# Patient Record
Sex: FEMALE | Race: BLACK OR AFRICAN AMERICAN | Employment: OTHER | URBAN - METROPOLITAN AREA
[De-identification: names, ages, dates, MRNs, and addresses within clinical notes are randomized per-mention and may not be internally consistent; named-entity substitution may affect disease eponyms.]

---

## 2017-08-14 ENCOUNTER — HOSPITAL ENCOUNTER (OUTPATIENT)
Dept: MAMMOGRAPHY | Age: 72
Discharge: HOME | End: 2017-08-14
Attending: INTERNAL MEDICINE
Payer: MEDICARE

## 2017-08-14 DIAGNOSIS — Z12.31 VISIT FOR SCREENING MAMMOGRAM: ICD-10-CM

## 2017-08-14 PROCEDURE — 77067 SCR MAMMO BI INCL CAD: CPT

## 2017-08-14 PROCEDURE — 77063 BREAST TOMOSYNTHESIS BI: CPT

## 2018-10-24 ENCOUNTER — HOSPITAL ENCOUNTER (OUTPATIENT)
Dept: MAMMOGRAPHY | Age: 73
Discharge: HOME | End: 2018-10-24
Attending: INTERNAL MEDICINE
Payer: MEDICARE

## 2018-10-24 DIAGNOSIS — Z12.31 VISIT FOR SCREENING MAMMOGRAM: ICD-10-CM

## 2018-10-24 PROCEDURE — 77067 SCR MAMMO BI INCL CAD: CPT

## 2019-04-03 ENCOUNTER — APPOINTMENT (OUTPATIENT)
Dept: CT IMAGING | Age: 74
End: 2019-04-03
Attending: EMERGENCY MEDICINE
Payer: MEDICARE

## 2019-04-03 ENCOUNTER — HOSPITAL ENCOUNTER (EMERGENCY)
Age: 74
Discharge: HOME | End: 2019-04-03
Attending: EMERGENCY MEDICINE
Payer: MEDICARE

## 2019-04-03 VITALS
WEIGHT: 147 LBS | SYSTOLIC BLOOD PRESSURE: 120 MMHG | OXYGEN SATURATION: 100 % | DIASTOLIC BLOOD PRESSURE: 68 MMHG | TEMPERATURE: 99 F | HEART RATE: 86 BPM | RESPIRATION RATE: 16 BRPM | BODY MASS INDEX: 25.23 KG/M2

## 2019-04-03 DIAGNOSIS — R51.9 NONINTRACTABLE EPISODIC HEADACHE, UNSPECIFIED HEADACHE TYPE: Primary | ICD-10-CM

## 2019-04-03 DIAGNOSIS — M26.649 TMJ ARTHRITIS: ICD-10-CM

## 2019-04-03 LAB
ALBUMIN SERPL-MCNC: 4.1 G/DL (ref 3.4–5)
ALBUMIN/GLOB SERPL: 1 {RATIO} (ref 0.8–1.7)
ALP SERPL-CCNC: 71 U/L (ref 45–117)
ALT SERPL-CCNC: 17 U/L (ref 13–56)
ANION GAP SERPL CALC-SCNC: 6 MMOL/L (ref 3–18)
AST SERPL-CCNC: 36 U/L (ref 15–37)
BASOPHILS # BLD: 0 K/UL (ref 0–0.1)
BASOPHILS NFR BLD: 1 % (ref 0–2)
BILIRUB SERPL-MCNC: 0.7 MG/DL (ref 0.2–1)
BUN SERPL-MCNC: 18 MG/DL (ref 7–18)
BUN/CREAT SERPL: 13 (ref 12–20)
CALCIUM SERPL-MCNC: 9.2 MG/DL (ref 8.5–10.1)
CHLORIDE SERPL-SCNC: 99 MMOL/L (ref 100–108)
CO2 SERPL-SCNC: 29 MMOL/L (ref 21–32)
CREAT SERPL-MCNC: 1.35 MG/DL (ref 0.6–1.3)
DIFFERENTIAL METHOD BLD: ABNORMAL
EOSINOPHIL # BLD: 0.1 K/UL (ref 0–0.4)
EOSINOPHIL NFR BLD: 1 % (ref 0–5)
ERYTHROCYTE [DISTWIDTH] IN BLOOD BY AUTOMATED COUNT: 13.4 % (ref 11.6–14.5)
ERYTHROCYTE [SEDIMENTATION RATE] IN BLOOD: 38 MM/HR (ref 0–30)
GLOBULIN SER CALC-MCNC: 4.1 G/DL (ref 2–4)
GLUCOSE SERPL-MCNC: 105 MG/DL (ref 74–99)
HCT VFR BLD AUTO: 31.4 % (ref 35–45)
HGB BLD-MCNC: 10.1 G/DL (ref 12–16)
LYMPHOCYTES # BLD: 2.5 K/UL (ref 0.9–3.6)
LYMPHOCYTES NFR BLD: 33 % (ref 21–52)
MCH RBC QN AUTO: 25.9 PG (ref 24–34)
MCHC RBC AUTO-ENTMCNC: 32.2 G/DL (ref 31–37)
MCV RBC AUTO: 80.5 FL (ref 74–97)
MONOCYTES # BLD: 0.5 K/UL (ref 0.05–1.2)
MONOCYTES NFR BLD: 7 % (ref 3–10)
NEUTS SEG # BLD: 4.6 K/UL (ref 1.8–8)
NEUTS SEG NFR BLD: 58 % (ref 40–73)
PLATELET # BLD AUTO: 264 K/UL (ref 135–420)
PMV BLD AUTO: 9.6 FL (ref 9.2–11.8)
POTASSIUM SERPL-SCNC: 3.8 MMOL/L (ref 3.5–5.5)
PROT SERPL-MCNC: 8.2 G/DL (ref 6.4–8.2)
RBC # BLD AUTO: 3.9 M/UL (ref 4.2–5.3)
SODIUM SERPL-SCNC: 134 MMOL/L (ref 136–145)
WBC # BLD AUTO: 7.7 K/UL (ref 4.6–13.2)

## 2019-04-03 PROCEDURE — 74011250637 HC RX REV CODE- 250/637: Performed by: EMERGENCY MEDICINE

## 2019-04-03 PROCEDURE — 99283 EMERGENCY DEPT VISIT LOW MDM: CPT

## 2019-04-03 PROCEDURE — 80053 COMPREHEN METABOLIC PANEL: CPT

## 2019-04-03 PROCEDURE — 74011636637 HC RX REV CODE- 636/637: Performed by: EMERGENCY MEDICINE

## 2019-04-03 PROCEDURE — 70450 CT HEAD/BRAIN W/O DYE: CPT

## 2019-04-03 PROCEDURE — 85025 COMPLETE CBC W/AUTO DIFF WBC: CPT

## 2019-04-03 PROCEDURE — 85652 RBC SED RATE AUTOMATED: CPT

## 2019-04-03 RX ORDER — PREDNISONE 20 MG/1
60 TABLET ORAL
Status: COMPLETED | OUTPATIENT
Start: 2019-04-03 | End: 2019-04-03

## 2019-04-03 RX ORDER — ACETAMINOPHEN 500 MG
1000 TABLET ORAL
Status: COMPLETED | OUTPATIENT
Start: 2019-04-03 | End: 2019-04-03

## 2019-04-03 RX ORDER — METHYLPREDNISOLONE 4 MG/1
TABLET ORAL
Qty: 1 DOSE PACK | Refills: 0 | Status: SHIPPED | OUTPATIENT
Start: 2019-04-03 | End: 2022-08-01

## 2019-04-03 RX ADMIN — ACETAMINOPHEN 1000 MG: 500 TABLET ORAL at 11:31

## 2019-04-03 RX ADMIN — PREDNISONE 60 MG: 20 TABLET ORAL at 11:31

## 2019-04-03 NOTE — ED NOTES
Pt arrives to exam room f3 with c/o HA that \"its gotten better already, but my parents  of heart attacks and strokes so I want to make sure I'm okay since I get headaches like this every month. \" pupils 4mm, muscle  equal with strong , pt ambulatory with steady gait, a/ox4, speaking in complete sentences with clear speech.

## 2019-04-03 NOTE — DISCHARGE INSTRUCTIONS
Headache: Care Instructions  Your Care Instructions    Headaches have many possible causes. Most headaches aren't a sign of a more serious problem, and they will get better on their own. Home treatment may help you feel better faster. The doctor has checked you carefully, but problems can develop later. If you notice any problems or new symptoms, get medical treatment right away. Follow-up care is a key part of your treatment and safety. Be sure to make and go to all appointments, and call your doctor if you are having problems. It's also a good idea to know your test results and keep a list of the medicines you take. How can you care for yourself at home? · Do not drive if you have taken a prescription pain medicine. · Rest in a quiet, dark room until your headache is gone. Close your eyes and try to relax or go to sleep. Don't watch TV or read. · Put a cold, moist cloth or cold pack on the painful area for 10 to 20 minutes at a time. Put a thin cloth between the cold pack and your skin. · Use a warm, moist towel or a heating pad set on low to relax tight shoulder and neck muscles. · Have someone gently massage your neck and shoulders. · Take pain medicines exactly as directed. ? If the doctor gave you a prescription medicine for pain, take it as prescribed. ? If you are not taking a prescription pain medicine, ask your doctor if you can take an over-the-counter medicine. · Be careful not to take pain medicine more often than the instructions allow, because you may get worse or more frequent headaches when the medicine wears off. · Do not ignore new symptoms that occur with a headache, such as a fever, weakness or numbness, vision changes, or confusion. These may be signs of a more serious problem. To prevent headaches  · Keep a headache diary so you can figure out what triggers your headaches. Avoiding triggers may help you prevent headaches.  Record when each headache began, how long it lasted, and what the pain was like (throbbing, aching, stabbing, or dull). Write down any other symptoms you had with the headache, such as nausea, flashing lights or dark spots, or sensitivity to bright light or loud noise. Note if the headache occurred near your period. List anything that might have triggered the headache, such as certain foods (chocolate, cheese, wine) or odors, smoke, bright light, stress, or lack of sleep. · Find healthy ways to deal with stress. Headaches are most common during or right after stressful times. Take time to relax before and after you do something that has caused a headache in the past.  · Try to keep your muscles relaxed by keeping good posture. Check your jaw, face, neck, and shoulder muscles for tension, and try relaxing them. When sitting at a desk, change positions often, and stretch for 30 seconds each hour. · Get plenty of sleep and exercise. · Eat regularly and well. Long periods without food can trigger a headache. · Treat yourself to a massage. Some people find that regular massages are very helpful in relieving tension. · Limit caffeine by not drinking too much coffee, tea, or soda. But don't quit caffeine suddenly, because that can also give you headaches. · Reduce eyestrain from computers by blinking frequently and looking away from the computer screen every so often. Make sure you have proper eyewear and that your monitor is set up properly, about an arm's length away. · Seek help if you have depression or anxiety. Your headaches may be linked to these conditions. Treatment can both prevent headaches and help with symptoms of anxiety or depression. When should you call for help? Call 911 anytime you think you may need emergency care. For example, call if:    · You have signs of a stroke. These may include:  ? Sudden numbness, paralysis, or weakness in your face, arm, or leg, especially on only one side of your body. ? Sudden vision changes.   ? Sudden trouble speaking. ? Sudden confusion or trouble understanding simple statements. ? Sudden problems with walking or balance. ? A sudden, severe headache that is different from past headaches.    Call your doctor now or seek immediate medical care if:    · You have a new or worse headache.     · Your headache gets much worse. Where can you learn more? Go to http://shiva-saumya.info/. Enter M271 in the search box to learn more about \"Headache: Care Instructions. \"  Current as of: Rosetta 3, 2018  Content Version: 11.9  © 7984-6798 [employer]. Care instructions adapted under license by [employer] (which disclaims liability or warranty for this information). If you have questions about a medical condition or this instruction, always ask your healthcare professional. [address] any warranty or liability for your use of this information. Patient Education        Headache: Care Instructions  Your Care Instructions    Headaches have many possible causes. Most headaches aren't a sign of a more serious problem, and they will get better on their own. Home treatment may help you feel better faster. The doctor has checked you carefully, but problems can develop later. If you notice any problems or new symptoms, get medical treatment right away. Follow-up care is a key part of your treatment and safety. Be sure to make and go to all appointments, and call your doctor if you are having problems. It's also a good idea to know your test results and keep a list of the medicines you take. How can you care for yourself at home? · Do not drive if you have taken a prescription pain medicine. · Rest in a quiet, dark room until your headache is gone. Close your eyes and try to relax or go to sleep. Don't watch TV or read. · Put a cold, moist cloth or cold pack on the painful area for 10 to 20 minutes at a time.  Put a thin cloth between the cold pack and your skin.  · Use a warm, moist towel or a heating pad set on low to relax tight shoulder and neck muscles. · Have someone gently massage your neck and shoulders. · Take pain medicines exactly as directed. ? If the doctor gave you a prescription medicine for pain, take it as prescribed. ? If you are not taking a prescription pain medicine, ask your doctor if you can take an over-the-counter medicine. · Be careful not to take pain medicine more often than the instructions allow, because you may get worse or more frequent headaches when the medicine wears off. · Do not ignore new symptoms that occur with a headache, such as a fever, weakness or numbness, vision changes, or confusion. These may be signs of a more serious problem. To prevent headaches  · Keep a headache diary so you can figure out what triggers your headaches. Avoiding triggers may help you prevent headaches. Record when each headache began, how long it lasted, and what the pain was like (throbbing, aching, stabbing, or dull). Write down any other symptoms you had with the headache, such as nausea, flashing lights or dark spots, or sensitivity to bright light or loud noise. Note if the headache occurred near your period. List anything that might have triggered the headache, such as certain foods (chocolate, cheese, wine) or odors, smoke, bright light, stress, or lack of sleep. · Find healthy ways to deal with stress. Headaches are most common during or right after stressful times. Take time to relax before and after you do something that has caused a headache in the past.  · Try to keep your muscles relaxed by keeping good posture. Check your jaw, face, neck, and shoulder muscles for tension, and try relaxing them. When sitting at a desk, change positions often, and stretch for 30 seconds each hour. · Get plenty of sleep and exercise. · Eat regularly and well. Long periods without food can trigger a headache. · Treat yourself to a massage.  Some people find that regular massages are very helpful in relieving tension. · Limit caffeine by not drinking too much coffee, tea, or soda. But don't quit caffeine suddenly, because that can also give you headaches. · Reduce eyestrain from computers by blinking frequently and looking away from the computer screen every so often. Make sure you have proper eyewear and that your monitor is set up properly, about an arm's length away. · Seek help if you have depression or anxiety. Your headaches may be linked to these conditions. Treatment can both prevent headaches and help with symptoms of anxiety or depression. When should you call for help? Call 911 anytime you think you may need emergency care. For example, call if:    · You have signs of a stroke. These may include:  ? Sudden numbness, paralysis, or weakness in your face, arm, or leg, especially on only one side of your body. ? Sudden vision changes. ? Sudden trouble speaking. ? Sudden confusion or trouble understanding simple statements. ? Sudden problems with walking or balance. ? A sudden, severe headache that is different from past headaches.    Call your doctor now or seek immediate medical care if:    · You have a new or worse headache.     · Your headache gets much worse. Where can you learn more? Go to http://shiva-saumya.info/. Enter M271 in the search box to learn more about \"Headache: Care Instructions. \"  Current as of: Rosetta 3, 2018  Content Version: 11.9  © 3747-2776 [employer]. Care instructions adapted under license by [employer] (which disclaims liability or warranty for this information). If you have questions about a medical condition or this instruction, always ask your healthcare professional. [place] [address] any warranty or liability for your use of this information.

## 2019-04-03 NOTE — ED PROVIDER NOTES
EMERGENCY DEPARTMENT HISTORY AND PHYSICAL EXAM    Date: 4/3/2019  Patient Name: Kelvin Crow    History of Presenting Illness     Chief Complaint   Patient presents with    Headache         History Provided By: Patient      Additional History (Context): Kelvin Crow is a 66 y.o. female with hypertension and hyperlipidemia who presents with headache since last night. Pain is in the right temple radiating over to the forehead with an occasional Milena charting pain to the vertex of her head. Denies recent head trauma, nausea vomiting, nuchal rigidity or fever. Denies unilateral weakness, changes in speech or vision. Patient had a similar headache approximately 1 month ago. Takes her blood pressure medications regularly. Blood pressure here is 128/1 20/68. Took Excedrin last time this happened and things resolved easily. Took Excedrin last night and thinks improved but this morning when she woke she was still having a little bit of a headache so she came on in having driven herself. PCP: Jamaal Escamilla MD    Current Facility-Administered Medications   Medication Dose Route Frequency Provider Last Rate Last Dose    predniSONE (DELTASONE) tablet 60 mg  60 mg Oral NOW Sima Spence PA        acetaminophen (TYLENOL) tablet 1,000 mg  1,000 mg Oral NOW Sima Spence PA         Current Outpatient Medications   Medication Sig Dispense Refill    cloNIDine HCl (CATAPRES) 0.2 mg tablet Take 0.2 mg by mouth two (2) times a day. Indications: HYPERTENSION      minoxidil (LONITEN) 10 mg tablet Take 10 mg by mouth daily. Indications: HYPERTENSION, takes 1/2 pill daily      hydrochlorothiazide (HYDRODIURIL) 25 mg tablet Take 25 mg by mouth daily. Indications: HYPERTENSION      atenolol (TENORMIN) 50 mg tablet Take 50 mg by mouth daily. Indications: HYPERTENSION      simvastatin (ZOCOR) 40 mg tablet Take 40 mg by mouth nightly.  captopril (CAPOTEN) 50 mg tablet Take 50 mg by mouth two (2) times a day. Indications: HYPERTENSION         Past History     Past Medical History:  Past Medical History:   Diagnosis Date    Arthritis     High cholesterol     Hypertension     Ill-defined condition     Enlarged Heart        Past Surgical History:  Past Surgical History:   Procedure Laterality Date    BREAST SURGERY PROCEDURE UNLISTED      cyst removed    CARDIAC SURG PROCEDURE UNLIST      cardiac cath       Family History:  Family History   Problem Relation Age of Onset    Cancer Brother        Social History:  Social History     Tobacco Use    Smoking status: Never Smoker   Substance Use Topics    Alcohol use: No    Drug use: No       Allergies:  No Known Allergies      Review of Systems   Review of Systems   Constitutional: Negative for fever. Eyes: Negative for visual disturbance. Gastrointestinal: Negative for nausea and vomiting. Musculoskeletal: Negative for neck pain and neck stiffness. Skin: Negative for rash and wound. Neurological: Positive for headaches. Negative for dizziness, tremors, seizures, syncope, facial asymmetry, speech difficulty, weakness, light-headedness and numbness. All Other Systems Negative  Physical Exam     Vitals:    04/03/19 0949   BP: 120/68   Pulse: 86   Resp: 16   Temp: 99 °F (37.2 °C)   SpO2: 100%   Weight: 66.7 kg (147 lb)     Physical Exam   Constitutional: She is oriented to person, place, and time. She appears well-developed. HENT:   Head: Normocephalic and atraumatic. Eyes: Pupils are equal, round, and reactive to light. EOM are normal.   No nystagmus. Neck: No JVD present. No tracheal deviation present. No thyromegaly present. Cardiovascular: Normal rate, regular rhythm and normal heart sounds. Exam reveals no gallop and no friction rub. No murmur heard. Pulmonary/Chest: Effort normal and breath sounds normal. No stridor. No respiratory distress. She has no wheezes. She has no rales. She exhibits no tenderness. Abdominal: Soft.  She exhibits no distension and no mass. There is no tenderness. There is no rebound and no guarding. Musculoskeletal: She exhibits no edema or tenderness. Lymphadenopathy:     She has no cervical adenopathy. Neurological: She is alert and oriented to person, place, and time. Negative Romberg. No dysdiadochokinesis, past tremor, past pointing. Normal heel shin. Skin: Skin is warm and dry. No rash noted. No erythema. No pallor. Psychiatric: She has a normal mood and affect. Her behavior is normal. Thought content normal.   Nursing note and vitals reviewed. Diagnostic Study Results     Labs -     Recent Results (from the past 12 hour(s))   CBC WITH AUTOMATED DIFF    Collection Time: 04/03/19 10:24 AM   Result Value Ref Range    WBC 7.7 4.6 - 13.2 K/uL    RBC 3.90 (L) 4.20 - 5.30 M/uL    HGB 10.1 (L) 12.0 - 16.0 g/dL    HCT 31.4 (L) 35.0 - 45.0 %    MCV 80.5 74.0 - 97.0 FL    MCH 25.9 24.0 - 34.0 PG    MCHC 32.2 31.0 - 37.0 g/dL    RDW 13.4 11.6 - 14.5 %    PLATELET 840 800 - 871 K/uL    MPV 9.6 9.2 - 11.8 FL    NEUTROPHILS 58 40 - 73 %    LYMPHOCYTES 33 21 - 52 %    MONOCYTES 7 3 - 10 %    EOSINOPHILS 1 0 - 5 %    BASOPHILS 1 0 - 2 %    ABS. NEUTROPHILS 4.6 1.8 - 8.0 K/UL    ABS. LYMPHOCYTES 2.5 0.9 - 3.6 K/UL    ABS. MONOCYTES 0.5 0.05 - 1.2 K/UL    ABS. EOSINOPHILS 0.1 0.0 - 0.4 K/UL    ABS.  BASOPHILS 0.0 0.0 - 0.1 K/UL    DF AUTOMATED     METABOLIC PANEL, COMPREHENSIVE    Collection Time: 04/03/19 10:24 AM   Result Value Ref Range    Sodium 134 (L) 136 - 145 mmol/L    Potassium 3.8 3.5 - 5.5 mmol/L    Chloride 99 (L) 100 - 108 mmol/L    CO2 29 21 - 32 mmol/L    Anion gap 6 3.0 - 18 mmol/L    Glucose 105 (H) 74 - 99 mg/dL    BUN 18 7.0 - 18 MG/DL    Creatinine 1.35 (H) 0.6 - 1.3 MG/DL    BUN/Creatinine ratio 13 12 - 20      GFR est AA 46 (L) >60 ml/min/1.73m2    GFR est non-AA 38 (L) >60 ml/min/1.73m2    Calcium 9.2 8.5 - 10.1 MG/DL    Bilirubin, total 0.7 0.2 - 1.0 MG/DL    ALT (SGPT) 17 13 - 56 U/L    AST (SGOT) 36 15 - 37 U/L    Alk. phosphatase 71 45 - 117 U/L    Protein, total 8.2 6.4 - 8.2 g/dL    Albumin 4.1 3.4 - 5.0 g/dL    Globulin 4.1 (H) 2.0 - 4.0 g/dL    A-G Ratio 1.0 0.8 - 1.7     SED RATE (ESR)    Collection Time: 04/03/19 10:24 AM   Result Value Ref Range    Sed rate, automated 38 (H) 0 - 30 mm/hr       Radiologic Studies -   CT HEAD WO CONT   Final Result   IMPRESSION:    1. No acute intracranial findings. 2. Chronic insults with mild encephalomalacia in the superior right greater than   left cerebellum. 3. Suspected at least mild spinal stenosis at C1 due to calcific ligamentous   tissues posterior to odontoid. No spinal canal could be best evaluated with   cervical spine MR imaging. 4. 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 CT Results  (Last 48 hours)               04/03/19 1102  CT HEAD WO CONT Final result    Impression:  IMPRESSION:    1. No acute intracranial findings. 2. Chronic insults with mild encephalomalacia in the superior right greater than   left cerebellum. 3. Suspected at least mild spinal stenosis at C1 due to calcific ligamentous   tissues posterior to odontoid. No spinal canal could be best evaluated with   cervical spine MR imaging. 4. 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 Narrative:  CT OF THE HEAD WITHOUT CONTRAST. CLINICAL HISTORY: Severe headache, monthly. TECHNIQUE: Helical scan obtained of the head were obtained from the skull vertex   through the base of the skull without intravenous contrast.    All CT scans are   performed using dose optimization techniques as appropriate to the performed   exam including the following: Automated exposure control, adjustment of mA   and/or kV according to patient size, and use of iterative reconstructive   technique. COMPARISON: None.        FINDINGS:        Peripheral site of encephalomalacia at the posterior superior right cerebellum. There is small site of cortical atrophy versus additional encephalomalacia at   the superior left cerebellum. Minimal periventricular white matter hypodensities   for age. Remainder of the sulcal pattern is normal. No hydrocephalus. No   intracranial hemorrhage. No mass effect. The visualized paranasal sinuses are   clear. The mastoid air cells are clear. A least mild temporomandibular   degenerative joint disease with osteophyte formation bilaterally. Heavy, thick   calcific ligamentous tissues posterior to the odontoid may contribute to at   least mild spinal stenosis at C1 level, only partially imaged. CXR Results  (Last 48 hours)    None            Medical Decision Making   I am the first provider for this patient. I reviewed the vital signs, available nursing notes, past medical history, past surgical history, family history and social history. Vital Signs-Reviewed the patient's vital signs. Records Reviewed: Nursing Notes    Procedures:  Procedures    Provider Notes (Medical Decision Making): Check labs and sent for scan. CT scan reassuring. Patient has very slight very slight elevation of her sed rate. Patient says that her headache was actually more intense last night and was actually resolving this morning but since it was still there at which which is why she came to the emergency department. Will get vascular surgeon for nonresolution of symptoms as well as a steroid here. Patient says her headache has completely resolved. DC home. MED RECONCILIATION:  Current Facility-Administered Medications   Medication Dose Route Frequency    predniSONE (DELTASONE) tablet 60 mg  60 mg Oral NOW    acetaminophen (TYLENOL) tablet 1,000 mg  1,000 mg Oral NOW     Current Outpatient Medications   Medication Sig    cloNIDine HCl (CATAPRES) 0.2 mg tablet Take 0.2 mg by mouth two (2) times a day.  Indications: HYPERTENSION    minoxidil (LONITEN) 10 mg tablet Take 10 mg by mouth daily. Indications: HYPERTENSION, takes 1/2 pill daily    hydrochlorothiazide (HYDRODIURIL) 25 mg tablet Take 25 mg by mouth daily. Indications: HYPERTENSION    atenolol (TENORMIN) 50 mg tablet Take 50 mg by mouth daily. Indications: HYPERTENSION    simvastatin (ZOCOR) 40 mg tablet Take 40 mg by mouth nightly.  captopril (CAPOTEN) 50 mg tablet Take 50 mg by mouth two (2) times a day. Indications: HYPERTENSION       Disposition:  home    DISCHARGE NOTE:   11:32 AM    Pt has been reexamined. Patient has no new complaints, changes, or physical findings. Care plan outlined and precautions discussed. Results of labs, CT were reviewed with the patient. All medications were reviewed with the patient; will d/c home with prednisone. All of pt's questions and concerns were addressed. Patient was instructed and agrees to follow up with PCP, vascular, as well as to return to the ED upon further deterioration. Patient is ready to go home. Follow-up Information     Follow up With Specialties Details Why Contact Info    Coreen Talavera MD Internal Medicine Schedule an appointment as soon as possible for a visit today  [address] [facility]  [phone]      Sukhdev Paulino MD Vascular Surgery Schedule an appointment as soon as possible for a visit in 1 day if your headache doesn't resolve [facility] [address]  [phone]      [facility] EMERGENCY DEPT Emergency Medicine  If symptoms worsen return immediately [address]  [phone]          Current Discharge Medication List            Diagnosis     Clinical Impression:   1. Nonintractable episodic headache, unspecified headache type    2.  TMJ arthritis

## 2020-12-10 ENCOUNTER — APPOINTMENT (OUTPATIENT)
Dept: CT IMAGING | Age: 75
End: 2020-12-10
Attending: EMERGENCY MEDICINE
Payer: MEDICARE

## 2020-12-10 ENCOUNTER — HOSPITAL ENCOUNTER (EMERGENCY)
Age: 75
Discharge: HOME | End: 2020-12-10
Attending: EMERGENCY MEDICINE
Payer: MEDICARE

## 2020-12-10 VITALS
OXYGEN SATURATION: 98 % | HEART RATE: 91 BPM | TEMPERATURE: 98.9 F | RESPIRATION RATE: 19 BRPM | SYSTOLIC BLOOD PRESSURE: 125 MMHG | DIASTOLIC BLOOD PRESSURE: 76 MMHG

## 2020-12-10 DIAGNOSIS — G44.219 EPISODIC TENSION-TYPE HEADACHE, NOT INTRACTABLE: Primary | ICD-10-CM

## 2020-12-10 PROCEDURE — 70450 CT HEAD/BRAIN W/O DYE: CPT

## 2020-12-10 PROCEDURE — 74011250637 HC RX REV CODE- 250/637: Performed by: EMERGENCY MEDICINE

## 2020-12-10 PROCEDURE — 99282 EMERGENCY DEPT VISIT SF MDM: CPT

## 2020-12-10 RX ORDER — ACETAMINOPHEN 325 MG/1
650 TABLET ORAL ONCE
Status: COMPLETED | OUTPATIENT
Start: 2020-12-10 | End: 2020-12-10

## 2020-12-10 RX ADMIN — ACETAMINOPHEN 650 MG: 325 TABLET ORAL at 12:35

## 2020-12-10 NOTE — ED PROVIDER NOTES
EMERGENCY DEPARTMENT HISTORY AND PHYSICAL EXAM    8:52 AM      Date: 12/10/2020  Patient Name: Rahul Acharya    History of Presenting Illness     Chief Complaint   Patient presents with    Headache         History Provided By: Patient    Additional History (Context): Rahul Acharya is a 66 y.o. female with hypertension and hyperlipidemia who presents with chief complaint of a headache intermittent for the past 3 days. She states that she was taking Excedrin but her PCP had her stop taking that to take regular Tylenol. She states that she did not have any this time to take. She reports that Excedrin and Tylenol usually helps the pain of her head ache. She states that the headache is mainly on the right side but also goes towards the back of her head. She denies any sinus pressure, congestion, dizziness, blurry vision, weakness, numbness, fever, trauma, and no other complaints. PCP: Janet Rae MD        Past History     Past Medical History:  Past Medical History:   Diagnosis Date    Arthritis     High cholesterol     Hypertension     Ill-defined condition     Enlarged Heart        Past Surgical History:  Past Surgical History:   Procedure Laterality Date    BREAST SURGERY PROCEDURE UNLISTED      cyst removed    CARDIAC SURG PROCEDURE UNLIST      cardiac cath       Family History:  Family History   Problem Relation Age of Onset    Cancer Brother        Social History:  Social History     Tobacco Use    Smoking status: Never Smoker   Substance Use Topics    Alcohol use: No    Drug use: No       Allergies:  No Known Allergies      Review of Systems       Review of Systems   Constitutional: Negative for chills and fever. HENT: Negative for congestion, rhinorrhea, sore throat and trouble swallowing. Eyes: Negative for photophobia and visual disturbance. Respiratory: Negative for cough and shortness of breath. Cardiovascular: Negative for chest pain.    Gastrointestinal: Negative for nausea and vomiting. Endocrine: Negative for polyuria. Genitourinary: Negative. Musculoskeletal: Negative for arthralgias, neck pain and neck stiffness. Skin: Negative for pallor and rash. Neurological: Positive for headaches. Negative for dizziness, weakness and numbness. Hematological: Does not bruise/bleed easily. Psychiatric/Behavioral: Negative for confusion and dysphoric mood. All other systems reviewed and are negative. Physical Exam     Visit Vitals  /76 (BP 1 Location: Right arm, BP Patient Position: At rest)   Pulse 91   Temp 98.9 °F (37.2 °C)   Resp 19   SpO2 98%         Physical Exam  Vitals signs and nursing note reviewed. Constitutional:       General: She is not in acute distress. Appearance: She is well-developed. She is not ill-appearing, toxic-appearing or diaphoretic. HENT:      Head: Normocephalic and atraumatic. Comments: No sinus pressure to palpation    No temporal tenderness to palpation     Nose: No congestion. Eyes:      General: No scleral icterus. Extraocular Movements: Extraocular movements intact. Conjunctiva/sclera: Conjunctivae normal.      Pupils: Pupils are equal, round, and reactive to light. Neck:      Musculoskeletal: Normal range of motion and neck supple. Vascular: No carotid bruit. Cardiovascular:      Rate and Rhythm: Normal rate. Pulses: Normal pulses. Heart sounds: Normal heart sounds. Pulmonary:      Effort: Pulmonary effort is normal. No respiratory distress. Breath sounds: Normal breath sounds. No wheezing. Musculoskeletal: Normal range of motion. Lymphadenopathy:      Cervical: No cervical adenopathy. Skin:     General: Skin is warm and dry. Neurological:      General: No focal deficit present. Mental Status: She is alert and oriented to person, place, and time. Cranial Nerves: No cranial nerve deficit. Sensory: No sensory deficit. Motor: No weakness. Coordination: Coordination normal.      Comments: No cerebellar ataxia on finger-nose test  No slurred speech  No facial droop  Strength 5 out of 5 bilateral upper and lower extremities   Psychiatric:         Mood and Affect: Mood normal.           Diagnostic Study Results     Labs -  No results found for this or any previous visit (from the past 12 hour(s)). Radiologic Studies -   CT HEAD WO CONT   Final Result   IMPRESSION:       1. No acute findings or change to prior. 2. 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 3. Cervical spinal stenosis at due to hypertrophied calcific ligamentous tissues   posterior to odontoid. Spinal canal and cord assessment could be best evaluated   with MRI cervical spine imaging. Medical Decision Making   I am the first provider for this patient. I reviewed the vital signs, available nursing notes, past medical history, past surgical history, family history and social history. Vital Signs-Reviewed the patient's vital signs. Records Reviewed: Nursing Notes and Old Medical Records (Time of Review: 8:52 AM)    Provider Notes (Medical Decision Making): DDx: Tension headache, brain mass, ICH    Doubt SAH    Get CT brain, give Tylenol    Neuro grossly intact    MDM    Medications   acetaminophen (TYLENOL) tablet 650 mg (650 mg Oral Given 12/10/20 1235)         ED Course: Progress Notes, Reevaluation, and Consults: We will have her take over-the-counter Tylenol    I have reassessed the patient. I have discussed the workup, results and plan with the patient and patient is in agreement. Patient has no new complaints. Patient was discharge in stable condition. Patient was given outpatient follow up. Patient is to return to emergency department if any new or worsening condition. Diagnosis     Clinical Impression:   1.  Episodic tension-type headache, not intractable        Disposition: Discharged    Follow-up Information     Follow up With Specialties Details Why Contact Info    Twyla Cristobal MD Internal Medicine Schedule an appointment as soon as possible for a visit in 3 days  [address] [facility] EMERGENCY DEPT Emergency Medicine  If symptoms worsen, As needed [address]  [phone]           Patient's Medications   Start Taking    No medications on file   Continue Taking    ATENOLOL (TENORMIN) 50 MG TABLET    Take 50 mg by mouth daily. Indications: HYPERTENSION    CAPTOPRIL (CAPOTEN) 50 MG TABLET    Take 50 mg by mouth two (2) times a day. Indications: HYPERTENSION    CLONIDINE HCL (CATAPRES) 0.2 MG TABLET    Take 0.2 mg by mouth two (2) times a day. Indications: HYPERTENSION    HYDROCHLOROTHIAZIDE (HYDRODIURIL) 25 MG TABLET    Take 25 mg by mouth daily. Indications: HYPERTENSION    METHYLPREDNISOLONE (MEDROL, DAVINA,) 4 MG TABLET    Per dose pack instructions    MINOXIDIL (LONITEN) 10 MG TABLET    Take 10 mg by mouth daily. Indications: HYPERTENSION, takes 1/2 pill daily    SIMVASTATIN (ZOCOR) 40 MG TABLET    Take 40 mg by mouth nightly. These Medications have changed    No medications on file   Stop Taking    No medications on file         Antoinette Guevara DO    Dragon medical dictation software was used for portions of this report. Unintended transcription errors may occur. My signature above authenticates this document and my orders, the final    diagnosis (es), discharge prescription (s), and instructions in the Epic    record.

## 2020-12-10 NOTE — ED TRIAGE NOTES
Patient A/O x 4, complaining of headache x 3 days. Patient denies N/V, blurred vision, gait changes or other complaints.

## 2020-12-10 NOTE — DISCHARGE INSTRUCTIONS
Patient Education     Take over-the-counter Tylenol as recommended by your primary care provider     Tension Headache: Care Instructions  Your Care Instructions  Most headaches are tension headaches. These headaches tend to happen again, especially if you are under stress. A tension headache may cause pain or a feeling of pressure all over your head. You probably can't pinpoint the center of the pain. If you keep getting tension headaches, the best thing you can do to limit them is to find out what is causing them and then make changes in those areas. Follow-up care is a key part of your treatment and safety. Be sure to make and go to all appointments, and call your doctor if you are having problems. It's also a good idea to know your test results and keep a list of the medicines you take. How can you care for yourself at home? · Rest in a quiet, dark room with a cool cloth on your forehead until your headache is gone. Close your eyes, and try to relax or go to sleep. Don't watch TV or read. Avoid using the computer. · Use a warm, moist towel or a heating pad set on low to relax tight shoulder and neck muscles. · Have someone gently massage your neck and shoulders. · Take pain medicines exactly as directed. ? If the doctor gave you a prescription medicine for pain, take it as prescribed. ? If you are not taking a prescription pain medicine, ask your doctor if you can take an over-the-counter medicine. · Be careful not to take pain medicine more often than the instructions allow, because you may get worse or more frequent headaches when the medicine wears off. · If you get another tension headache, stop what you are doing and sit quietly for a moment. Close your eyes and breathe slowly. Try to relax your head and neck muscles. · Do not ignore new symptoms that occur with a headache, such as fever, weakness or numbness, vision changes, or confusion. These may be signs of a more serious problem.   To help prevent headaches  · Keep a headache diary so you can figure out what triggers your headaches. Avoiding triggers may help you prevent headaches. Record when each headache began, how long it lasted, and what the pain was like (throbbing, aching, stabbing, or dull). List anything that may have triggered the headache, such as being physically or emotionally stressed or being anxious or depressed. Other possible triggers are hunger, anger, fatigue, poor posture, and muscle strain. · Find healthy ways to deal with stress. Headaches are most common during or right after stressful times. Take time to relax before and after you do something that has caused a headache in the past.  · Exercise daily to relieve stress. Relaxation exercises may help reduce tension. · Get plenty of sleep. · Eat regularly and well. Long periods without food can trigger a headache. · Treat yourself to a massage. Some people find that massages are very helpful in relieving tension. · Try to keep your muscles relaxed by keeping good posture. Check your jaw, face, neck, and shoulder muscles for tension, and try to relax them. When sitting at a desk, change positions often, and stretch for 30 seconds each hour. · Reduce eyestrain from computers by blinking frequently and looking away from the computer screen every so often. Make sure you have proper eyewear and that your monitor is set up properly, about an arm's length away. When should you call for help? Call 911 anytime you think you may need emergency care. For example, call if:    · You have signs of a stroke. These may include:  ? Sudden numbness, paralysis, or weakness in your face, arm, or leg, especially on only one side of your body. ? Sudden vision changes. ? Sudden trouble speaking. ? Sudden confusion or trouble understanding simple statements. ? Sudden problems with walking or balance. ? A sudden, severe headache that is different from past headaches.    Call your doctor now or seek immediate medical care if:    · You have new or worse nausea and vomiting.     · You have a new or higher fever.     · Your headache gets much worse. Watch closely for changes in your health, and be sure to contact your doctor if:    · You are not getting better after 2 days (48 hours). Where can you learn more? Go to http://www.gray.com/  Enter C544 in the search box to learn more about \"Tension Headache: Care Instructions. \"  Current as of: November 20, 2019               Content Version: 12.6  © 7894-9904 [employer]. Care instructions adapted under license by [employer] (which disclaims liability or warranty for this information). If you have questions about a medical condition or this instruction, always ask your healthcare professional. [address] any warranty or liability for your use of this information.

## 2022-07-30 ENCOUNTER — HOSPITAL ENCOUNTER (INPATIENT)
Age: 77
LOS: 2 days | Discharge: HOME | DRG: 683 | End: 2022-08-01
Attending: EMERGENCY MEDICINE | Admitting: HOSPITALIST
Payer: MEDICARE

## 2022-07-30 ENCOUNTER — APPOINTMENT (OUTPATIENT)
Dept: CT IMAGING | Age: 77
DRG: 683 | End: 2022-07-30
Attending: STUDENT IN AN ORGANIZED HEALTH CARE EDUCATION/TRAINING PROGRAM
Payer: MEDICARE

## 2022-07-30 ENCOUNTER — APPOINTMENT (OUTPATIENT)
Dept: GENERAL RADIOLOGY | Age: 77
DRG: 683 | End: 2022-07-30
Attending: EMERGENCY MEDICINE
Payer: MEDICARE

## 2022-07-30 DIAGNOSIS — N17.9 AKI (ACUTE KIDNEY INJURY) (HCC): Primary | ICD-10-CM

## 2022-07-30 DIAGNOSIS — D64.9 ANEMIA, UNSPECIFIED TYPE: ICD-10-CM

## 2022-07-30 DIAGNOSIS — E87.6 HYPOKALEMIA: ICD-10-CM

## 2022-07-30 PROBLEM — I51.7 LEFT VENTRICULAR HYPERTROPHY: Status: ACTIVE | Noted: 2022-07-30

## 2022-07-30 PROBLEM — N39.0 UTI (URINARY TRACT INFECTION): Status: ACTIVE | Noted: 2022-07-30

## 2022-07-30 LAB
ALBUMIN SERPL-MCNC: 3.6 G/DL (ref 3.4–5)
ALBUMIN/GLOB SERPL: 1.1 {RATIO} (ref 0.8–1.7)
ALP SERPL-CCNC: 60 U/L (ref 45–117)
ALT SERPL-CCNC: 21 U/L (ref 13–56)
ANION GAP SERPL CALC-SCNC: 11 MMOL/L (ref 3–18)
APPEARANCE UR: CLEAR
AST SERPL-CCNC: 34 U/L (ref 10–38)
BACTERIA URNS QL MICRO: ABNORMAL /HPF
BASOPHILS # BLD: 0 K/UL (ref 0–0.1)
BASOPHILS NFR BLD: 0 % (ref 0–2)
BILIRUB SERPL-MCNC: 0.7 MG/DL (ref 0.2–1)
BILIRUB UR QL: NEGATIVE
BNP SERPL-MCNC: 813 PG/ML (ref 0–1800)
BUN SERPL-MCNC: 33 MG/DL (ref 7–18)
BUN/CREAT SERPL: 12 (ref 12–20)
CALCIUM SERPL-MCNC: 8.6 MG/DL (ref 8.5–10.1)
CHLORIDE SERPL-SCNC: 105 MMOL/L (ref 100–111)
CO2 SERPL-SCNC: 24 MMOL/L (ref 21–32)
COLOR UR: YELLOW
CREAT SERPL-MCNC: 2.74 MG/DL (ref 0.6–1.3)
DIFFERENTIAL METHOD BLD: ABNORMAL
EOSINOPHIL # BLD: 0.1 K/UL (ref 0–0.4)
EOSINOPHIL NFR BLD: 1 % (ref 0–5)
EPITH CASTS URNS QL MICRO: ABNORMAL /LPF (ref 0–5)
ERYTHROCYTE [DISTWIDTH] IN BLOOD BY AUTOMATED COUNT: 14.2 % (ref 11.6–14.5)
GLOBULIN SER CALC-MCNC: 3.2 G/DL (ref 2–4)
GLUCOSE SERPL-MCNC: 111 MG/DL (ref 74–99)
GLUCOSE UR STRIP.AUTO-MCNC: NEGATIVE MG/DL
HCT VFR BLD AUTO: 26.4 % (ref 35–45)
HEMOCCULT STL QL: NEGATIVE
HGB BLD-MCNC: 8.6 G/DL (ref 12–16)
HGB UR QL STRIP: NEGATIVE
HYALINE CASTS URNS QL MICRO: ABNORMAL /LPF (ref 0–2)
IMM GRANULOCYTES # BLD AUTO: 0 K/UL (ref 0–0.04)
IMM GRANULOCYTES NFR BLD AUTO: 0 % (ref 0–0.5)
INR PPP: 1.1 (ref 0.8–1.2)
KETONES UR QL STRIP.AUTO: ABNORMAL MG/DL
LACTATE BLD-SCNC: 1 MMOL/L (ref 0.4–2)
LEUKOCYTE ESTERASE UR QL STRIP.AUTO: ABNORMAL
LIPASE SERPL-CCNC: 181 U/L (ref 73–393)
LYMPHOCYTES # BLD: 1.6 K/UL (ref 0.9–3.6)
LYMPHOCYTES NFR BLD: 19 % (ref 21–52)
MAGNESIUM SERPL-MCNC: 1.8 MG/DL (ref 1.6–2.6)
MCH RBC QN AUTO: 26.9 PG (ref 24–34)
MCHC RBC AUTO-ENTMCNC: 32.6 G/DL (ref 31–37)
MCV RBC AUTO: 82.5 FL (ref 78–100)
MONOCYTES # BLD: 1 K/UL (ref 0.05–1.2)
MONOCYTES NFR BLD: 13 % (ref 3–10)
NEUTS SEG # BLD: 5.3 K/UL (ref 1.8–8)
NEUTS SEG NFR BLD: 66 % (ref 40–73)
NITRITE UR QL STRIP.AUTO: NEGATIVE
NRBC # BLD: 0 K/UL (ref 0–0.01)
NRBC BLD-RTO: 0 PER 100 WBC
PH UR STRIP: 5.5 [PH] (ref 5–8)
PLATELET # BLD AUTO: 187 K/UL (ref 135–420)
PMV BLD AUTO: 10.6 FL (ref 9.2–11.8)
POTASSIUM SERPL-SCNC: 3 MMOL/L (ref 3.5–5.5)
PROT SERPL-MCNC: 6.8 G/DL (ref 6.4–8.2)
PROT UR STRIP-MCNC: ABNORMAL MG/DL
PROTHROMBIN TIME: 14.2 SEC (ref 11.5–15.2)
RBC # BLD AUTO: 3.2 M/UL (ref 4.2–5.3)
RBC #/AREA URNS HPF: NEGATIVE /HPF (ref 0–5)
SODIUM SERPL-SCNC: 140 MMOL/L (ref 136–145)
SP GR UR REFRACTOMETRY: 1.01 (ref 1–1.03)
TROPONIN-HIGH SENSITIVITY: 24 NG/L (ref 0–54)
UROBILINOGEN UR QL STRIP.AUTO: 1 EU/DL (ref 0.2–1)
WBC # BLD AUTO: 8 K/UL (ref 4.6–13.2)
WBC URNS QL MICRO: ABNORMAL /HPF (ref 0–4)

## 2022-07-30 PROCEDURE — 74011250636 HC RX REV CODE- 250/636: Performed by: HOSPITALIST

## 2022-07-30 PROCEDURE — 99223 1ST HOSP IP/OBS HIGH 75: CPT | Performed by: HOSPITALIST

## 2022-07-30 PROCEDURE — 96365 THER/PROPH/DIAG IV INF INIT: CPT

## 2022-07-30 PROCEDURE — 83605 ASSAY OF LACTIC ACID: CPT

## 2022-07-30 PROCEDURE — 83690 ASSAY OF LIPASE: CPT

## 2022-07-30 PROCEDURE — 85610 PROTHROMBIN TIME: CPT

## 2022-07-30 PROCEDURE — 65270000046 HC RM TELEMETRY

## 2022-07-30 PROCEDURE — 93005 ELECTROCARDIOGRAM TRACING: CPT

## 2022-07-30 PROCEDURE — 94762 N-INVAS EAR/PLS OXIMTRY CONT: CPT

## 2022-07-30 PROCEDURE — 81001 URINALYSIS AUTO W/SCOPE: CPT

## 2022-07-30 PROCEDURE — 74011250637 HC RX REV CODE- 250/637: Performed by: STUDENT IN AN ORGANIZED HEALTH CARE EDUCATION/TRAINING PROGRAM

## 2022-07-30 PROCEDURE — 84484 ASSAY OF TROPONIN QUANT: CPT

## 2022-07-30 PROCEDURE — 83880 ASSAY OF NATRIURETIC PEPTIDE: CPT

## 2022-07-30 PROCEDURE — 74011000250 HC RX REV CODE- 250: Performed by: STUDENT IN AN ORGANIZED HEALTH CARE EDUCATION/TRAINING PROGRAM

## 2022-07-30 PROCEDURE — 2709999900 HC NON-CHARGEABLE SUPPLY

## 2022-07-30 PROCEDURE — 71045 X-RAY EXAM CHEST 1 VIEW: CPT

## 2022-07-30 PROCEDURE — 82272 OCCULT BLD FECES 1-3 TESTS: CPT

## 2022-07-30 PROCEDURE — 74011250636 HC RX REV CODE- 250/636: Performed by: STUDENT IN AN ORGANIZED HEALTH CARE EDUCATION/TRAINING PROGRAM

## 2022-07-30 PROCEDURE — 87086 URINE CULTURE/COLONY COUNT: CPT

## 2022-07-30 PROCEDURE — 85025 COMPLETE CBC W/AUTO DIFF WBC: CPT

## 2022-07-30 PROCEDURE — 99285 EMERGENCY DEPT VISIT HI MDM: CPT

## 2022-07-30 PROCEDURE — 96375 TX/PRO/DX INJ NEW DRUG ADDON: CPT

## 2022-07-30 PROCEDURE — 80053 COMPREHEN METABOLIC PANEL: CPT

## 2022-07-30 PROCEDURE — 70450 CT HEAD/BRAIN W/O DYE: CPT

## 2022-07-30 PROCEDURE — 83735 ASSAY OF MAGNESIUM: CPT

## 2022-07-30 RX ORDER — ACETAMINOPHEN 500 MG
1000 TABLET ORAL
Status: COMPLETED | OUTPATIENT
Start: 2022-07-30 | End: 2022-07-30

## 2022-07-30 RX ORDER — ACETAMINOPHEN 325 MG/1
650 TABLET ORAL
Status: DISCONTINUED | OUTPATIENT
Start: 2022-07-30 | End: 2022-08-01 | Stop reason: HOSPADM

## 2022-07-30 RX ORDER — SODIUM CHLORIDE 9 MG/ML
75 INJECTION, SOLUTION INTRAVENOUS ONCE
Status: COMPLETED | OUTPATIENT
Start: 2022-07-30 | End: 2022-07-30

## 2022-07-30 RX ORDER — ONDANSETRON 2 MG/ML
4 INJECTION INTRAMUSCULAR; INTRAVENOUS
Status: DISCONTINUED | OUTPATIENT
Start: 2022-07-30 | End: 2022-08-01 | Stop reason: HOSPADM

## 2022-07-30 RX ORDER — SODIUM CHLORIDE 9 MG/ML
75 INJECTION, SOLUTION INTRAVENOUS CONTINUOUS
Status: DISCONTINUED | OUTPATIENT
Start: 2022-07-30 | End: 2022-08-01 | Stop reason: HOSPADM

## 2022-07-30 RX ORDER — ACETAMINOPHEN 650 MG/1
650 SUPPOSITORY RECTAL
Status: DISCONTINUED | OUTPATIENT
Start: 2022-07-30 | End: 2022-08-01 | Stop reason: HOSPADM

## 2022-07-30 RX ORDER — POTASSIUM CHLORIDE 7.45 MG/ML
10 INJECTION INTRAVENOUS
Status: COMPLETED | OUTPATIENT
Start: 2022-07-30 | End: 2022-07-30

## 2022-07-30 RX ORDER — POTASSIUM CHLORIDE 20 MEQ/1
40 TABLET, EXTENDED RELEASE ORAL
Status: COMPLETED | OUTPATIENT
Start: 2022-07-30 | End: 2022-07-30

## 2022-07-30 RX ORDER — POLYETHYLENE GLYCOL 3350 17 G/17G
17 POWDER, FOR SOLUTION ORAL DAILY PRN
Status: DISCONTINUED | OUTPATIENT
Start: 2022-07-30 | End: 2022-08-01 | Stop reason: HOSPADM

## 2022-07-30 RX ORDER — ATORVASTATIN CALCIUM 20 MG/1
20 TABLET, FILM COATED ORAL DAILY
Status: DISCONTINUED | OUTPATIENT
Start: 2022-07-31 | End: 2022-08-01 | Stop reason: HOSPADM

## 2022-07-30 RX ORDER — ONDANSETRON 4 MG/1
4 TABLET, ORALLY DISINTEGRATING ORAL
Status: DISCONTINUED | OUTPATIENT
Start: 2022-07-30 | End: 2022-08-01 | Stop reason: HOSPADM

## 2022-07-30 RX ADMIN — SODIUM CHLORIDE 75 ML/HR: 9 INJECTION, SOLUTION INTRAVENOUS at 22:39

## 2022-07-30 RX ADMIN — POTASSIUM CHLORIDE 40 MEQ: 1500 TABLET, EXTENDED RELEASE ORAL at 17:25

## 2022-07-30 RX ADMIN — WATER 1 G: 1 INJECTION INTRAMUSCULAR; INTRAVENOUS; SUBCUTANEOUS at 18:53

## 2022-07-30 RX ADMIN — POTASSIUM CHLORIDE 10 MEQ: 7.46 INJECTION, SOLUTION INTRAVENOUS at 17:25

## 2022-07-30 RX ADMIN — SODIUM CHLORIDE 75 ML/HR: 9 INJECTION, SOLUTION INTRAVENOUS at 18:53

## 2022-07-30 RX ADMIN — ACETAMINOPHEN 1000 MG: 500 TABLET ORAL at 16:38

## 2022-07-30 NOTE — Clinical Note
Status[de-identified] INPATIENT [101]   Type of Bed: Telemetry [19]   Cardiac Monitoring Required?: Yes   Inpatient Hospitalization Certified Necessary for the Following Reasons: 3.  Patient receiving treatment that can only be provided in an inpatient setting (further clarification in H&P documentation)   Admitting Diagnosis: UTI (urinary tract infection) [095398]   Admitting Diagnosis: REN (acute kidney injury) [facility]) [8471100]   Admitting Diagnosis: Anemia [038680]   Admitting Diagnosis: Left ventricular hypertrophy [269768]   Admitting Physician: Danilo Ag [[id number]]   Attending Physician: Danilo Ag [[id number]]   Estimated Length of Stay: 3-4 Midnights   Discharge Plan[de-identified] [facility]

## 2022-07-30 NOTE — ED NOTES
Assumed care of patient. Patient resting comfortably on stretcher with no complaints. White board updated. Will continue to monitor.

## 2022-07-30 NOTE — ED NOTES
Received patient via EMS stretcher. Per EMS patient has c/o lightheadedness, dizziness, and headaches x 2 days. Checked her BP at home with systolic being in the 27'P. Patient arrived to ED receiving bolus of NS. Patient arrived normotensive and A&Ox4.

## 2022-07-30 NOTE — H&P
HISTORY & PHYSICAL      Patient: Francheska Ritter MRN: [id number]  [facility]: [id number]    YOB: 1945  Age: 76 y.o. Sex: female    DOA: 7/30/2022 LOS:  LOS: 0 days        DOA: 7/30/2022        Assessment/Plan     Active Problems:    UTI (urinary tract infection) (7/30/2022)      Anemia (7/30/2022)      Left ventricular hypertrophy (7/30/2022)      REN (acute kidney injury) ([facility] [address] [facility] (7/30/2022)        Patient Active Problem List   Diagnosis Code    UTI (urinary tract infection) N39.0    Anemia D64.9    Left ventricular hypertrophy I51.7    [facility] N17.9         Plan:  UTI-IV Rocephin, urine culture sent, reports currently pending. Hypotension-improved with IVF, continue IVF at this time, hold all antihypertensives. Anemia of chronic disease-monitor iron and folic XDCD/S01 levels, replete as necessary. CKD 3-no recent lab work to reflect change in creatinine, continue gentle hydration, monitor strict I's and O's, if no improvement will consult nephrology for further input. History of hypertension-patient mentions she is on 5 different blood pressure medications, mentions she has been taking them as prescribed. She also mentions that she has lost some weight and there has been no recent medication change. DVT prophylaxis - heparin  FC              17-year-old female presents to the emergency room secondary to generalized weakness. ER evaluation patient noted to have UTI, started on broad-spectrum IV antibiotics and admission to the hospital for further evaluation. Patient mentions she was doing well till recently, no recent change in medications. Mentions she has been taking her blood pressure medications despite losing weight. ER evaluation-patient noted to be orthostatic on arrival, resuscitated with IV fluids with improvement of blood pressure. Patient was also noted to have REN-continue IV fluids, monitor strict I's and O's.   Hold all antihypertensive at this time.  Patient will be admitted to the hospital for further evaluation. Past Medical History:   Diagnosis Date    Arthritis     High cholesterol     Hypertension     Ill-defined condition     Enlarged Heart        Past Surgical History:   Procedure Laterality Date    BREAST SURGERY PROCEDURE UNLISTED      cyst removed    CARDIAC SURG PROCEDURE UNLIST      cardiac cath       Family History   Problem Relation Age of Onset    Cancer Brother        Social History     Socioeconomic History    Marital status: SINGLE   Tobacco Use    Smoking status: Never   Substance and Sexual Activity    Alcohol use: No    Drug use: No       Prior to Admission medications    Medication Sig Start Date End Date Taking? Authorizing Provider   methylPREDNISolone (MEDROL, DAVINA,) 4 mg tablet Per dose pack instructions 4/3/19   SHEILA Luna   cloNIDine HCl (CATAPRES) 0.2 mg tablet Take 0.2 mg by mouth two (2) times a day. Indications: HYPERTENSION    Provider, Historical   minoxidil (LONITEN) 10 mg tablet Take 10 mg by mouth daily. Indications: HYPERTENSION, takes 1/2 pill daily    Provider, Historical   hydrochlorothiazide (HYDRODIURIL) 25 mg tablet Take 25 mg by mouth daily. Indications: HYPERTENSION    Provider, Historical   atenolol (TENORMIN) 50 mg tablet Take 50 mg by mouth daily. Indications: HYPERTENSION    Provider, Historical   simvastatin (ZOCOR) 40 mg tablet Take 40 mg by mouth nightly. Provider, Historical   captopril (CAPOTEN) 50 mg tablet Take 50 mg by mouth two (2) times a day. Indications: HYPERTENSION    Provider, Historical       No Known Allergies    Review of Systems:    Pertinent Positives noted in HPI. Rest all other ROS were noted to be negative. Physical Exam:      Visit Vitals  BP (!) 121/58   Pulse 91   Temp 98.2 °F (36.8 °C)   Resp 13   SpO2 100%       Physical Exam:    Gen: In general, this is a well nourished female in no acute distress  HEENT: Sclerae nonicteric.  Oral mucous membranes moist. Dentition poor  Neck: Supple with midline trachea. CV: RRR without murmur or rub appreciated. Resp:Respirations are unlabored without use of accessory muscles. Lung fields B/L without wheezes or rhonchi. Abd: Soft, nontender, nondistended. Extrem: Extremities are warm, without cyanosis or clubbing. No pitting pretibial edema. Skin: Warm, no visible rashes. Neuro: Patient is alert, oriented, and cooperative. No obvious focal defects. Moves all 4 extremities. Labs Reviewed:    Recent Results (from the past 24 hour(s))   CBC WITH AUTOMATED DIFF    Collection Time: 07/30/22  3:50 PM   Result Value Ref Range    WBC 8.0 4.6 - 13.2 K/uL    RBC 3.20 (L) 4.20 - 5.30 M/uL    HGB 8.6 (L) 12.0 - 16.0 g/dL    HCT 26.4 (L) 35.0 - 45.0 %    MCV 82.5 78.0 - 100.0 FL    MCH 26.9 24.0 - 34.0 PG    MCHC 32.6 31.0 - 37.0 g/dL    RDW 14.2 11.6 - 14.5 %    PLATELET 098 415 - 185 K/uL    MPV 10.6 9.2 - 11.8 FL    NRBC 0.0 0  WBC    ABSOLUTE NRBC 0.00 0.00 - 0.01 K/uL    NEUTROPHILS 66 40 - 73 %    LYMPHOCYTES 19 (L) 21 - 52 %    MONOCYTES 13 (H) 3 - 10 %    EOSINOPHILS 1 0 - 5 %    BASOPHILS 0 0 - 2 %    IMMATURE GRANULOCYTES 0 0.0 - 0.5 %    ABS. NEUTROPHILS 5.3 1.8 - 8.0 K/UL    ABS. LYMPHOCYTES 1.6 0.9 - 3.6 K/UL    ABS. MONOCYTES 1.0 0.05 - 1.2 K/UL    ABS. EOSINOPHILS 0.1 0.0 - 0.4 K/UL    ABS. BASOPHILS 0.0 0.0 - 0.1 K/UL    ABS. IMM.  GRANS. 0.0 0.00 - 0.04 K/UL    DF AUTOMATED     PROTHROMBIN TIME + INR    Collection Time: 07/30/22  3:50 PM   Result Value Ref Range    Prothrombin time 14.2 11.5 - 15.2 sec    INR 1.1 0.8 - 1.2     METABOLIC PANEL, COMPREHENSIVE    Collection Time: 07/30/22  3:50 PM   Result Value Ref Range    Sodium 140 136 - 145 mmol/L    Potassium 3.0 (L) 3.5 - 5.5 mmol/L    Chloride 105 100 - 111 mmol/L    CO2 24 21 - 32 mmol/L    Anion gap 11 3.0 - 18 mmol/L    Glucose 111 (H) 74 - 99 mg/dL    BUN 33 (H) 7.0 - 18 MG/DL    Creatinine 2.74 (H) 0.6 - 1.3 MG/DL    BUN/Creatinine ratio 12 12 - 20      GFR est AA 20 (L) >60 ml/min/1.73m2    GFR est non-AA 17 (L) >60 ml/min/1.73m2    Calcium 8.6 8.5 - 10.1 MG/DL    Bilirubin, total 0.7 0.2 - 1.0 MG/DL    ALT (SGPT) 21 13 - 56 U/L    AST (SGOT) 34 10 - 38 U/L    Alk.  phosphatase 60 45 - 117 U/L    Protein, total 6.8 6.4 - 8.2 g/dL    Albumin 3.6 3.4 - 5.0 g/dL    Globulin 3.2 2.0 - 4.0 g/dL    A-G Ratio 1.1 0.8 - 1.7     TROPONIN-HIGH SENSITIVITY    Collection Time: 07/30/22  3:50 PM   Result Value Ref Range    Troponin-High Sensitivity 24 0 - 54 ng/L   NT-PRO BNP    Collection Time: 07/30/22  3:50 PM   Result Value Ref Range    NT pro- 0 - 1,800 PG/ML   LIPASE    Collection Time: 07/30/22  3:50 PM   Result Value Ref Range    Lipase 181 73 - 393 U/L   MAGNESIUM    Collection Time: 07/30/22  3:50 PM   Result Value Ref Range    Magnesium 1.8 1.6 - 2.6 mg/dL   POC LACTIC ACID    Collection Time: 07/30/22  4:18 PM   Result Value Ref Range    Lactic Acid (POC) 1.00 0.40 - 2.00 mmol/L   URINALYSIS W/ RFLX MICROSCOPIC    Collection Time: 07/30/22  4:50 PM   Result Value Ref Range    Color YELLOW      Appearance CLEAR      Specific gravity 1.013 1.005 - 1.030      pH (UA) 5.5 5.0 - 8.0      Protein TRACE (A) NEG mg/dL    Glucose Negative NEG mg/dL    Ketone TRACE (A) NEG mg/dL    Bilirubin Negative NEG      Blood Negative NEG      Urobilinogen 1.0 0.2 - 1.0 EU/dL    Nitrites Negative NEG      Leukocyte Esterase TRACE (A) NEG     URINE MICROSCOPIC ONLY    Collection Time: 07/30/22  4:50 PM   Result Value Ref Range    WBC 11 to 20 0 - 4 /hpf    RBC Negative 0 - 5 /hpf    Epithelial cells 2+ 0 - 5 /lpf    Bacteria FEW (A) NEG /hpf    Hyaline cast 1 to 4 0 - 2 /lpf   OCCULT BLOOD, STOOL    Collection Time: 07/30/22  5:00 PM   Result Value Ref Range    Occult blood, stool Negative NEG         Imaging Reviewed:    XR Results (most recent):  Results from Hospital Encounter encounter on 07/30/22    XR CHEST PORT    Narrative  CHEST PORTABLE 1601 hours    COMPARISON: 2006. INDICATIONS: Dizziness. FINDINGS:    Portable single view chest demonstrates:    Lungs: Clear. Cardiac Silhouette And Mediastinal Contours: Moderately enlarged cardiac  contours. Pleural Spaces: No pneumothorax or pleural effusion evident. Bones And Soft Tissues: Advanced degenerative changes are evident particularly  in the shoulders. Impression  No active or acute cardiopulmonary process is evident. Severe arthritic changes are present in the shoulders. Cardiomegaly       CT Results (most recent):  Results from Hospital Encounter encounter on 07/30/22    CT HEAD WO CONT    Narrative  CT OF THE HEAD WITHOUT CONTRAST. CLINICAL HISTORY: Headache for 2 days. TECHNIQUE: Helical scan obtained of the head were obtained from the skull vertex  through the base of the skull without intravenous contrast.    All CT scans are  performed using dose optimization techniques as appropriate to the performed  exam including the following: Automated exposure control, adjustment of mA  and/or kV according to patient size, and use of iterative reconstructive  technique. COMPARISON: 12/10/2020. FINDINGS:    Stable mild right greater than left cerebellar chronic infarct. Mild  periventricular white matter hypodensities are not significantly changed. Sulcal  pattern is maintained. No new findings. No hydrocephalus. No intracranial  hemorrhage. No mass effect. The visualized paranasal sinuses are clear. The  mastoid air cells are clear. Similar appearance of partially imaged chronic  severe calcific thickening of the ligamentous tissues posterior to the odontoid  contributing to at least mild C1 level spinal stenosis. Impression  1. Stable exam. No acute findings. 2. Stable chronic cerebellar infarcts and chronic microvascular disease. 3. Grossly stable chronic C1 level cervical spinal stenosis due to hypertrophic  calcific ligamentous tissues posterior to the odontoid.  This can be better  assessed with MRI cervical spine imaging. MRI Results (most recent):  No results found for this or any previous visit. Torrance Soulier, MD  7/30/2022, 7:09 PM        Disclaimer: Sections of this note are dictated using utilizing voice recognition software. Minor typographical errors may be present. If questions arise, please do not hesitate to contact me or call our department.

## 2022-07-30 NOTE — ED NOTES
Pt expressed that she felt well laying down. Pt stood of her own power off the edge of the bed and continued to state she felt well. No significant change in vitals.

## 2022-07-30 NOTE — ED PROVIDER NOTES
68-year-old female with a history of \"enlarged heart\" per patient, HTN, HLD presenting with c/o lightheadedness patient states she has not been taking her blood pressure medication for last 3 days, states that she took her blood pressure medication this morning when she got a refill of the medication, noted that her blood pressure was lower at home on evaluation, called emergency services. EMS report that when she got there and a blood pressure was noted to be 70 systolic. Administration of 500 cc normal saline resulted in systolic blood pressure of 120s on arrival.  Patient states that she feels much better on arrival, states that she has a mild headache that is abnormal for her, in a different location than her prior headaches, lightheadedness completely resolved, no new neuro symptoms throughout otherwise. Patient denies any chest pain shortness of breath abdominal pain blood from anywhere, urinary symptoms.        Past Medical History:   Diagnosis Date    Arthritis     High cholesterol     Hypertension     Ill-defined condition     Enlarged Heart        Past Surgical History:   Procedure Laterality Date    BREAST SURGERY PROCEDURE UNLISTED      cyst removed    CARDIAC SURG PROCEDURE UNLIST      cardiac cath         Family History:   Problem Relation Age of Onset    Cancer Brother        Social History     Socioeconomic History    Marital status: SINGLE     Spouse name: Not on file    Number of children: Not on file    Years of education: Not on file    Highest education level: Not on file   Occupational History    Not on file   Tobacco Use    Smoking status: Never    Smokeless tobacco: Not on file   Substance and Sexual Activity    Alcohol use: No    Drug use: No    Sexual activity: Not on file   Other Topics Concern    Not on file   Social History Narrative    Not on file     Social Determinants of Health     Financial Resource Strain: Not on file   Food Insecurity: Not on file   Transportation Needs: Not on file   Physical Activity: Not on file   Stress: Not on file   Social Connections: Not on file   Intimate Partner Violence: Not on file   Housing Stability: Not on file         ALLERGIES: Patient has no known allergies. Review of Systems   Constitutional:  Negative for chills and fever. HENT:  Negative for sore throat. Eyes:  Negative for pain and visual disturbance. Respiratory:  Negative for cough, chest tightness and shortness of breath. Cardiovascular:  Negative for chest pain and leg swelling. Gastrointestinal:  Negative for abdominal pain, anal bleeding, blood in stool, nausea and vomiting. Genitourinary:  Negative for dysuria and hematuria. Neurological:  Positive for light-headedness. Negative for syncope, facial asymmetry, speech difficulty, weakness and numbness. Psychiatric/Behavioral:  Negative for confusion. Vitals:    07/30/22 1733 07/30/22 1745 07/30/22 1759 07/30/22 1800   BP: 110/68 (!) 113/51 (!) 113/51 (!) 121/58   Pulse: 93 90 89 91   Resp:  25  (!) 0            Physical Exam  Vitals and nursing note reviewed. Constitutional:       General: She is not in acute distress. Appearance: Normal appearance. She is not ill-appearing, toxic-appearing or diaphoretic. HENT:      Mouth/Throat:      Mouth: Mucous membranes are moist.   Eyes:      General:         Right eye: No discharge. Left eye: No discharge. Extraocular Movements: Extraocular movements intact. Conjunctiva/sclera: Conjunctivae normal.   Cardiovascular:      Rate and Rhythm: Normal rate and regular rhythm. Pulses: Normal pulses. Pulmonary:      Effort: Pulmonary effort is normal. No respiratory distress. Abdominal:      General: Abdomen is flat. Palpations: Abdomen is soft. Tenderness: There is no abdominal tenderness. There is no guarding or rebound. Musculoskeletal:      Cervical back: Normal range of motion and neck supple. Right lower leg: No edema.       Left lower leg: No edema. Skin:     General: Skin is warm. Neurological:      General: No focal deficit present. Mental Status: She is alert and oriented to person, place, and time. Cranial Nerves: No cranial nerve deficit. Motor: No weakness. Psychiatric:         Mood and Affect: Mood normal.         Behavior: Behavior normal.         Thought Content: Thought content normal.         Judgment: Judgment normal.        MDM  Number of Diagnoses or Management Options  REN (acute kidney injury) ([facility] [address]  Anemia, unspecified type  Hypokalemia  Diagnosis management comments: 61-year-old female with the above comorbidities presenting today with lightheadedness, headache, and reportedly low blood pressure at home. Patient well-appearing, vitals reassuring, exam overall reassuring and benign with a benign and reassuring neuro exam.  Labs notable for REN, worsened anemia, hypokalemia, possible UTI. Imaging reassuring for any acute findings. EKG reassuring for no acute ischemia/arrhythmias or blocks. Bedside ultrasound showed significant left ventricular wall hypertrophy. Patient observed and reported that she remained improved from her symptoms, however, patient warrants admission due to REN, as well as findings on her bedside echo showing significantly enlarged left ventricular wall without any prior echo/cardiology notes to compare to. Hospitalist excepted for admission. Amount and/or Complexity of Data Reviewed  Clinical lab tests: reviewed  Tests in the radiology section of CPT®: reviewed  Decide to obtain previous medical records or to obtain history from someone other than the patient: yes      ED Course as of 08/01/22 0705   Sat Jul 30, 2022   1644 Patient evaluated with resident Dr Pk Tapia. Bedside echo with preserved EF but significant LVH and septal thickening. Unable to completely visualize rt atrium. No evidence of pericardial effusion.    Patient notes she has not been feeling well for a couple of days (symptoms did not occur suddenly today). She denies recent coughing, SOB, CP, AP, N/V/D. She is speaking clearly and exam without concern for stroke. IVF infusion to be provided and will get her up out of bed to check orthostatics and gait [JT]   1654 Labs reviewed and notable for drop in Hemoglobin to 8 from 10, potassium at 3 and REN. Lactic acid within normal limits. Pt admits to dark stools for months but takes aspirin. Denies BRBPR. On rectal exam, brown stool present in the vault. [JT]      ED Course User Index  [JT] Felicity San., MD       Procedures      I personally saw and examined the patient. I have reviewed and agree with the residents findings, including all diagnostic interpretations, and plans as written. I was present during the key portions of separately billed procedures. Manuel Ovalle.  MD Ester      Diagnosis:  Anemia  REN  Dehydration    Plan:  Admission for further management and care

## 2022-07-31 LAB
ALBUMIN SERPL-MCNC: 3.3 G/DL (ref 3.4–5)
ALBUMIN/GLOB SERPL: 0.9 {RATIO} (ref 0.8–1.7)
ALP SERPL-CCNC: 55 U/L (ref 45–117)
ALT SERPL-CCNC: 17 U/L (ref 13–56)
ANION GAP SERPL CALC-SCNC: 7 MMOL/L (ref 3–18)
AST SERPL-CCNC: 23 U/L (ref 10–38)
ATRIAL RATE: 92 BPM
BASOPHILS # BLD: 0 K/UL (ref 0–0.1)
BASOPHILS NFR BLD: 1 % (ref 0–2)
BILIRUB SERPL-MCNC: 0.8 MG/DL (ref 0.2–1)
BUN SERPL-MCNC: 26 MG/DL (ref 7–18)
BUN/CREAT SERPL: 15 (ref 12–20)
CALCIUM SERPL-MCNC: 8.7 MG/DL (ref 8.5–10.1)
CALCULATED P AXIS, ECG09: 52 DEGREES
CALCULATED R AXIS, ECG10: 0 DEGREES
CALCULATED T AXIS, ECG11: 42 DEGREES
CHLORIDE SERPL-SCNC: 109 MMOL/L (ref 100–111)
CO2 SERPL-SCNC: 25 MMOL/L (ref 21–32)
CREAT SERPL-MCNC: 1.7 MG/DL (ref 0.6–1.3)
DIAGNOSIS, 93000: NORMAL
DIFFERENTIAL METHOD BLD: ABNORMAL
EOSINOPHIL # BLD: 0.2 K/UL (ref 0–0.4)
EOSINOPHIL NFR BLD: 3 % (ref 0–5)
ERYTHROCYTE [DISTWIDTH] IN BLOOD BY AUTOMATED COUNT: 14.6 % (ref 11.6–14.5)
FOLATE SERPL-MCNC: >20 NG/ML (ref 3.1–17.5)
GLOBULIN SER CALC-MCNC: 3.7 G/DL (ref 2–4)
GLUCOSE SERPL-MCNC: 140 MG/DL (ref 74–99)
HCT VFR BLD AUTO: 28.6 % (ref 35–45)
HGB BLD-MCNC: 8.6 G/DL (ref 12–16)
IMM GRANULOCYTES # BLD AUTO: 0 K/UL (ref 0–0.04)
IMM GRANULOCYTES NFR BLD AUTO: 0 % (ref 0–0.5)
IRON SATN MFR SERPL: 24 % (ref 20–50)
IRON SERPL-MCNC: 53 UG/DL (ref 50–175)
LYMPHOCYTES # BLD: 1.8 K/UL (ref 0.9–3.6)
LYMPHOCYTES NFR BLD: 26 % (ref 21–52)
MCH RBC QN AUTO: 25 PG (ref 24–34)
MCHC RBC AUTO-ENTMCNC: 30.1 G/DL (ref 31–37)
MCV RBC AUTO: 83.1 FL (ref 78–100)
MONOCYTES # BLD: 1 K/UL (ref 0.05–1.2)
MONOCYTES NFR BLD: 14 % (ref 3–10)
NEUTS SEG # BLD: 3.9 K/UL (ref 1.8–8)
NEUTS SEG NFR BLD: 56 % (ref 40–73)
NRBC # BLD: 0 K/UL (ref 0–0.01)
NRBC BLD-RTO: 0 PER 100 WBC
P-R INTERVAL, ECG05: 160 MS
PLATELET # BLD AUTO: 222 K/UL (ref 135–420)
PMV BLD AUTO: 10.5 FL (ref 9.2–11.8)
POTASSIUM SERPL-SCNC: 3.1 MMOL/L (ref 3.5–5.5)
PROT SERPL-MCNC: 7 G/DL (ref 6.4–8.2)
Q-T INTERVAL, ECG07: 402 MS
QRS DURATION, ECG06: 82 MS
QTC CALCULATION (BEZET), ECG08: 497 MS
RBC # BLD AUTO: 3.44 M/UL (ref 4.2–5.3)
SODIUM SERPL-SCNC: 141 MMOL/L (ref 136–145)
TIBC SERPL-MCNC: 221 UG/DL (ref 250–450)
VENTRICULAR RATE, ECG03: 92 BPM
VIT B12 SERPL-MCNC: 784 PG/ML (ref 211–911)
WBC # BLD AUTO: 6.9 K/UL (ref 4.6–13.2)

## 2022-07-31 PROCEDURE — 36415 COLL VENOUS BLD VENIPUNCTURE: CPT

## 2022-07-31 PROCEDURE — 80053 COMPREHEN METABOLIC PANEL: CPT

## 2022-07-31 PROCEDURE — 74011250637 HC RX REV CODE- 250/637: Performed by: HOSPITALIST

## 2022-07-31 PROCEDURE — 2709999900 HC NON-CHARGEABLE SUPPLY

## 2022-07-31 PROCEDURE — 85025 COMPLETE CBC W/AUTO DIFF WBC: CPT

## 2022-07-31 PROCEDURE — 83540 ASSAY OF IRON: CPT

## 2022-07-31 PROCEDURE — 74011250637 HC RX REV CODE- 250/637: Performed by: INTERNAL MEDICINE

## 2022-07-31 PROCEDURE — 82607 VITAMIN B-12: CPT

## 2022-07-31 PROCEDURE — 74011250636 HC RX REV CODE- 250/636: Performed by: HOSPITALIST

## 2022-07-31 PROCEDURE — 65270000046 HC RM TELEMETRY

## 2022-07-31 PROCEDURE — 99232 SBSQ HOSP IP/OBS MODERATE 35: CPT | Performed by: HOSPITALIST

## 2022-07-31 PROCEDURE — 74011000250 HC RX REV CODE- 250: Performed by: HOSPITALIST

## 2022-07-31 RX ORDER — HYDRALAZINE HYDROCHLORIDE 20 MG/ML
10 INJECTION INTRAMUSCULAR; INTRAVENOUS
Status: DISCONTINUED | OUTPATIENT
Start: 2022-07-31 | End: 2022-08-01 | Stop reason: HOSPADM

## 2022-07-31 RX ORDER — MINOXIDIL 2.5 MG/1
2.5 TABLET ORAL DAILY
Status: DISCONTINUED | OUTPATIENT
Start: 2022-08-01 | End: 2022-08-01 | Stop reason: HOSPADM

## 2022-07-31 RX ORDER — ATENOLOL 50 MG/1
50 TABLET ORAL DAILY
Status: DISCONTINUED | OUTPATIENT
Start: 2022-08-01 | End: 2022-08-01 | Stop reason: HOSPADM

## 2022-07-31 RX ADMIN — ATORVASTATIN CALCIUM 20 MG: 20 TABLET, FILM COATED ORAL at 08:51

## 2022-07-31 RX ADMIN — NITROGLYCERIN 1 INCH: 20 OINTMENT TOPICAL at 20:55

## 2022-07-31 RX ADMIN — WATER 1 G: 1 INJECTION INTRAMUSCULAR; INTRAVENOUS; SUBCUTANEOUS at 17:53

## 2022-07-31 RX ADMIN — SODIUM CHLORIDE 75 ML/HR: 9 INJECTION, SOLUTION INTRAVENOUS at 20:58

## 2022-07-31 RX ADMIN — SODIUM CHLORIDE 75 ML/HR: 9 INJECTION, SOLUTION INTRAVENOUS at 08:51

## 2022-07-31 NOTE — PROGRESS NOTES
Wound Prevention Checklist    Patient: Morgan Burnett (79 y.o. female)  Date: 7/31/2022  Diagnosis: UTI (urinary tract infection) [N39.0]  REN (acute kidney injury) ([facility] [address] [N17.9]  Anemia [D64.9]  Left ventricular hypertrophy [I51.7] <principal problem not specified>    Precautions:         []  Heel prevention boots placed on patient    [x]  Patient turned q2h during shift    []  Lift team ordered    [x]  Patient on Vinny bed/Specialty bed    []  Each Wound is documented during shift (Stage, Color, drainage, odor, measurements, and dressings)    [x]  Dual skin checks done at bedside during shift report with Angella Roberson

## 2022-07-31 NOTE — PROGRESS NOTES
[facility]  Progress Note    Patient: Cena Lanes Age: 76 y.o. : 1945 MR#: [id number] SSN: [id number]  Date/Time: 2022     Subjective:     Patient seen and evaluated, lying in bed, no acute distress. 40-year-old female with a history of hypertension on multiple blood pressure medications presents to the emergency room secondary to dizziness lightheadedness. Patient was noted to be orthostatic and in REN. Patient started on IV fluids, blood pressure medications held, doing much better. UTI-started on IV antibiotic      Assessment/Plan:     UTI-IV antibiotics, urine culture not sent from the ER, has been sent today  Hypotension-orthostatic, responded well to IVF, continue to hold blood pressure medications at this time  REN-continue IVF, creatinine improving, continue to monitor. Repeat labs in a.m. Anemia of chronic disease-monitor H&H, check iron S77 and folic acid panels. DVT prophylaxis-Heparin  Full code                Lucina Alston MD  22      Case discussed with:  [x]Patient  []Family  []Nursing  []Case Management  DVT Prophylaxis:  []Lovenox  [x]Hep SQ  []SCDs  []Coumadin   []On Heparin gtt    Objective:   VS: Visit Vitals  BP (!) 153/75   Pulse 92   Temp 97.4 °F (36.3 °C)   Resp 18   SpO2 99%   Breastfeeding No      Tmax/24hrs: Temp (24hrs), Av.1 °F (36.7 °C), Min:97.4 °F (36.3 °C), Max:98.3 °F (36.8 °C)  IOBRIEF  Intake/Output Summary (Last 24 hours) at 2022 1242  Last data filed at 2022 0640  Gross per 24 hour   Intake 600 ml   Output 1150 ml   Net -550 ml       General:  Alert, cooperative, no acute distress    Pulmonary:  CTA Bilaterally. No Wheezing/Rhonchi/Rales. Cardiovascular: Regular rate and Rhythm. GI:  Soft, Non distended, Non tender. + Bowel sounds. Extremities:  No edema, cyanosis, clubbing. No calf tenderness. Neurologic: Alert and oriented X 3. No acute neuro deficits.   Additional:    Medications: Current Facility-Administered Medications   Medication Dose Route Frequency    atorvastatin (LIPITOR) tablet 20 mg  20 mg Oral DAILY    acetaminophen (TYLENOL) tablet 650 mg  650 mg Oral Q6H PRN    Or    acetaminophen (TYLENOL) suppository 650 mg  650 mg Rectal Q6H PRN    polyethylene glycol (MIRALAX) packet 17 g  17 g Oral DAILY PRN    ondansetron (ZOFRAN ODT) tablet 4 mg  4 mg Oral Q8H PRN    Or    ondansetron (ZOFRAN) injection 4 mg  4 mg IntraVENous Q6H PRN    0.9% sodium chloride infusion  75 mL/hr IntraVENous CONTINUOUS    cefTRIAXone (ROCEPHIN) 1 g in sterile water (preservative free) 10 mL IV syringe  1 g IntraVENous Q24H       Imaging:   XR Results (most recent):  Results from Hospital Encounter encounter on 07/30/22    XR CHEST PORT    Narrative  CHEST PORTABLE 1601 hours    COMPARISON: 2006. INDICATIONS: Dizziness. FINDINGS:    Portable single view chest demonstrates:    Lungs: Clear. Cardiac Silhouette And Mediastinal Contours: Moderately enlarged cardiac  contours. Pleural Spaces: No pneumothorax or pleural effusion evident. Bones And Soft Tissues: Advanced degenerative changes are evident particularly  in the shoulders. Impression  No active or acute cardiopulmonary process is evident. Severe arthritic changes are present in the shoulders. Cardiomegaly       CT Results (most recent):  Results from Hospital Encounter encounter on 07/30/22    CT HEAD WO CONT    Narrative  CT OF THE HEAD WITHOUT CONTRAST. CLINICAL HISTORY: Headache for 2 days. TECHNIQUE: Helical scan obtained of the head were obtained from the skull vertex  through the base of the skull without intravenous contrast.    All CT scans are  performed using dose optimization techniques as appropriate to the performed  exam including the following: Automated exposure control, adjustment of mA  and/or kV according to patient size, and use of iterative reconstructive  technique.     COMPARISON: 12/10/2020. FINDINGS:    Stable mild right greater than left cerebellar chronic infarct. Mild  periventricular white matter hypodensities are not significantly changed. Sulcal  pattern is maintained. No new findings. No hydrocephalus. No intracranial  hemorrhage. No mass effect. The visualized paranasal sinuses are clear. The  mastoid air cells are clear. Similar appearance of partially imaged chronic  severe calcific thickening of the ligamentous tissues posterior to the odontoid  contributing to at least mild C1 level spinal stenosis. Impression  1. Stable exam. No acute findings. 2. Stable chronic cerebellar infarcts and chronic microvascular disease. 3. Grossly stable chronic C1 level cervical spinal stenosis due to hypertrophic  calcific ligamentous tissues posterior to the odontoid. This can be better  assessed with MRI cervical spine imaging. MRI Results (most recent):  No results found for this or any previous visit. Labs:    Recent Results (from the past 48 hour(s))   CBC WITH AUTOMATED DIFF    Collection Time: 07/30/22  3:50 PM   Result Value Ref Range    WBC 8.0 4.6 - 13.2 K/uL    RBC 3.20 (L) 4.20 - 5.30 M/uL    HGB 8.6 (L) 12.0 - 16.0 g/dL    HCT 26.4 (L) 35.0 - 45.0 %    MCV 82.5 78.0 - 100.0 FL    MCH 26.9 24.0 - 34.0 PG    MCHC 32.6 31.0 - 37.0 g/dL    RDW 14.2 11.6 - 14.5 %    PLATELET 821 233 - 669 K/uL    MPV 10.6 9.2 - 11.8 FL    NRBC 0.0 0  WBC    ABSOLUTE NRBC 0.00 0.00 - 0.01 K/uL    NEUTROPHILS 66 40 - 73 %    LYMPHOCYTES 19 (L) 21 - 52 %    MONOCYTES 13 (H) 3 - 10 %    EOSINOPHILS 1 0 - 5 %    BASOPHILS 0 0 - 2 %    IMMATURE GRANULOCYTES 0 0.0 - 0.5 %    ABS. NEUTROPHILS 5.3 1.8 - 8.0 K/UL    ABS. LYMPHOCYTES 1.6 0.9 - 3.6 K/UL    ABS. MONOCYTES 1.0 0.05 - 1.2 K/UL    ABS. EOSINOPHILS 0.1 0.0 - 0.4 K/UL    ABS. BASOPHILS 0.0 0.0 - 0.1 K/UL    ABS. IMM.  GRANS. 0.0 0.00 - 0.04 K/UL    DF AUTOMATED     PROTHROMBIN TIME + INR    Collection Time: 07/30/22  3:50 PM Result Value Ref Range    Prothrombin time 14.2 11.5 - 15.2 sec    INR 1.1 0.8 - 1.2     METABOLIC PANEL, COMPREHENSIVE    Collection Time: 07/30/22  3:50 PM   Result Value Ref Range    Sodium 140 136 - 145 mmol/L    Potassium 3.0 (L) 3.5 - 5.5 mmol/L    Chloride 105 100 - 111 mmol/L    CO2 24 21 - 32 mmol/L    Anion gap 11 3.0 - 18 mmol/L    Glucose 111 (H) 74 - 99 mg/dL    BUN 33 (H) 7.0 - 18 MG/DL    Creatinine 2.74 (H) 0.6 - 1.3 MG/DL    BUN/Creatinine ratio 12 12 - 20      GFR est AA 20 (L) >60 ml/min/1.73m2    GFR est non-AA 17 (L) >60 ml/min/1.73m2    Calcium 8.6 8.5 - 10.1 MG/DL    Bilirubin, total 0.7 0.2 - 1.0 MG/DL    ALT (SGPT) 21 13 - 56 U/L    AST (SGOT) 34 10 - 38 U/L    Alk.  phosphatase 60 45 - 117 U/L    Protein, total 6.8 6.4 - 8.2 g/dL    Albumin 3.6 3.4 - 5.0 g/dL    Globulin 3.2 2.0 - 4.0 g/dL    A-G Ratio 1.1 0.8 - 1.7     TROPONIN-HIGH SENSITIVITY    Collection Time: 07/30/22  3:50 PM   Result Value Ref Range    Troponin-High Sensitivity 24 0 - 54 ng/L   NT-PRO BNP    Collection Time: 07/30/22  3:50 PM   Result Value Ref Range    NT pro- 0 - 1,800 PG/ML   LIPASE    Collection Time: 07/30/22  3:50 PM   Result Value Ref Range    Lipase 181 73 - 393 U/L   MAGNESIUM    Collection Time: 07/30/22  3:50 PM   Result Value Ref Range    Magnesium 1.8 1.6 - 2.6 mg/dL   EKG, 12 LEAD, INITIAL    Collection Time: 07/30/22  3:53 PM   Result Value Ref Range    Ventricular Rate 92 BPM    Atrial Rate 92 BPM    P-R Interval 160 ms    QRS Duration 82 ms    Q-T Interval 402 ms    QTC Calculation (Bezet) 497 ms    Calculated P Axis 52 degrees    Calculated R Axis 0 degrees    Calculated T Axis 42 degrees    Diagnosis       Sinus rhythm with occasional premature ventricular complexes  Prolonged QT  Abnormal ECG  No previous ECGs available  Confirmed by Chandrika Schmidt ([id number]) on 7/31/2022 12:13:22 PM     POC LACTIC ACID    Collection Time: 07/30/22  4:18 PM   Result Value Ref Range    Lactic Acid (POC) 1.00 0.40 - 2.00 mmol/L   URINALYSIS W/ RFLX MICROSCOPIC    Collection Time: 07/30/22  4:50 PM   Result Value Ref Range    Color YELLOW      Appearance CLEAR      Specific gravity 1.013 1.005 - 1.030      pH (UA) 5.5 5.0 - 8.0      Protein TRACE (A) NEG mg/dL    Glucose Negative NEG mg/dL    Ketone TRACE (A) NEG mg/dL    Bilirubin Negative NEG      Blood Negative NEG      Urobilinogen 1.0 0.2 - 1.0 EU/dL    Nitrites Negative NEG      Leukocyte Esterase TRACE (A) NEG     URINE MICROSCOPIC ONLY    Collection Time: 07/30/22  4:50 PM   Result Value Ref Range    WBC 11 to 20 0 - 4 /hpf    RBC Negative 0 - 5 /hpf    Epithelial cells 2+ 0 - 5 /lpf    Bacteria FEW (A) NEG /hpf    Hyaline cast 1 to 4 0 - 2 /lpf   OCCULT BLOOD, STOOL    Collection Time: 07/30/22  5:00 PM   Result Value Ref Range    Occult blood, stool Negative NEG     METABOLIC PANEL, COMPREHENSIVE    Collection Time: 07/31/22  2:42 AM   Result Value Ref Range    Sodium 141 136 - 145 mmol/L    Potassium 3.1 (L) 3.5 - 5.5 mmol/L    Chloride 109 100 - 111 mmol/L    CO2 25 21 - 32 mmol/L    Anion gap 7 3.0 - 18 mmol/L    Glucose 140 (H) 74 - 99 mg/dL    BUN 26 (H) 7.0 - 18 MG/DL    Creatinine 1.70 (H) 0.6 - 1.3 MG/DL    BUN/Creatinine ratio 15 12 - 20      GFR est AA 35 (L) >60 ml/min/1.73m2    GFR est non-AA 29 (L) >60 ml/min/1.73m2    Calcium 8.7 8.5 - 10.1 MG/DL    Bilirubin, total 0.8 0.2 - 1.0 MG/DL    ALT (SGPT) 17 13 - 56 U/L    AST (SGOT) 23 10 - 38 U/L    Alk.  phosphatase 55 45 - 117 U/L    Protein, total 7.0 6.4 - 8.2 g/dL    Albumin 3.3 (L) 3.4 - 5.0 g/dL    Globulin 3.7 2.0 - 4.0 g/dL    A-G Ratio 0.9 0.8 - 1.7     CBC WITH AUTOMATED DIFF    Collection Time: 07/31/22  2:42 AM   Result Value Ref Range    WBC 6.9 4.6 - 13.2 K/uL    RBC 3.44 (L) 4.20 - 5.30 M/uL    HGB 8.6 (L) 12.0 - 16.0 g/dL    HCT 28.6 (L) 35.0 - 45.0 %    MCV 83.1 78.0 - 100.0 FL    MCH 25.0 24.0 - 34.0 PG    MCHC 30.1 (L) 31.0 - 37.0 g/dL    RDW 14.6 (H) 11.6 - 14.5 %    PLATELET 246 135 - 420 K/uL    MPV 10.5 9.2 - 11.8 FL    NRBC 0.0 0  WBC    ABSOLUTE NRBC 0.00 0.00 - 0.01 K/uL    NEUTROPHILS 56 40 - 73 %    LYMPHOCYTES 26 21 - 52 %    MONOCYTES 14 (H) 3 - 10 %    EOSINOPHILS 3 0 - 5 %    BASOPHILS 1 0 - 2 %    IMMATURE GRANULOCYTES 0 0.0 - 0.5 %    ABS. NEUTROPHILS 3.9 1.8 - 8.0 K/UL    ABS. LYMPHOCYTES 1.8 0.9 - 3.6 K/UL    ABS. MONOCYTES 1.0 0.05 - 1.2 K/UL    ABS. EOSINOPHILS 0.2 0.0 - 0.4 K/UL    ABS. BASOPHILS 0.0 0.0 - 0.1 K/UL    ABS. IMM. GRANS. 0.0 0.00 - 0.04 K/UL    DF AUTOMATED         Signed By: Patience Currie MD     July 31, 2022      I spent 25 minutes with the patient in face-to-face consultation, of which greater than 50% was spent in counseling and coordination of care as described above    Disclaimer: Sections of this note are dictated using utilizing voice recognition software. Minor typographical errors may be present. If questions arise, please do not hesitate to contact me or call our department.

## 2022-08-01 ENCOUNTER — APPOINTMENT (OUTPATIENT)
Dept: NON INVASIVE DIAGNOSTICS | Age: 77
DRG: 683 | End: 2022-08-01
Attending: HOSPITALIST
Payer: MEDICARE

## 2022-08-01 VITALS
BODY MASS INDEX: 25.1 KG/M2 | WEIGHT: 147 LBS | RESPIRATION RATE: 20 BRPM | HEIGHT: 64 IN | TEMPERATURE: 98.2 F | HEART RATE: 95 BPM | SYSTOLIC BLOOD PRESSURE: 170 MMHG | OXYGEN SATURATION: 100 % | DIASTOLIC BLOOD PRESSURE: 79 MMHG

## 2022-08-01 LAB
ALBUMIN SERPL-MCNC: 3.6 G/DL (ref 3.4–5)
ALBUMIN/GLOB SERPL: 0.9 {RATIO} (ref 0.8–1.7)
ALP SERPL-CCNC: 56 U/L (ref 45–117)
ALT SERPL-CCNC: 18 U/L (ref 13–56)
ANION GAP SERPL CALC-SCNC: 7 MMOL/L (ref 3–18)
AST SERPL-CCNC: 26 U/L (ref 10–38)
BACTERIA SPEC CULT: NORMAL
BASOPHILS # BLD: 0 K/UL (ref 0–0.1)
BASOPHILS NFR BLD: 0 % (ref 0–2)
BILIRUB SERPL-MCNC: 0.9 MG/DL (ref 0.2–1)
BUN SERPL-MCNC: 18 MG/DL (ref 7–18)
BUN/CREAT SERPL: 16 (ref 12–20)
CALCIUM SERPL-MCNC: 9.2 MG/DL (ref 8.5–10.1)
CHLORIDE SERPL-SCNC: 109 MMOL/L (ref 100–111)
CO2 SERPL-SCNC: 26 MMOL/L (ref 21–32)
CREAT SERPL-MCNC: 1.15 MG/DL (ref 0.6–1.3)
DIFFERENTIAL METHOD BLD: ABNORMAL
ECHO AO ASC DIAM: 3.7 CM
ECHO AO ASCENDING AORTA INDEX: 2.15 CM/M2
ECHO AO ROOT DIAM: 3.6 CM
ECHO AO ROOT INDEX: 2.09 CM/M2
ECHO LA VOL 2C: 57 ML (ref 22–52)
ECHO LA VOL 4C: 48 ML (ref 22–52)
ECHO LA VOLUME AREA LENGTH: 55 ML
ECHO LA VOLUME INDEX A2C: 33 ML/M2 (ref 16–34)
ECHO LA VOLUME INDEX A4C: 28 ML/M2 (ref 16–34)
ECHO LA VOLUME INDEX AREA LENGTH: 32 ML/M2 (ref 16–34)
ECHO LV E' LATERAL VELOCITY: 3 CM/S
ECHO LV E' SEPTAL VELOCITY: 4 CM/S
ECHO LV FRACTIONAL SHORTENING: 29 % (ref 28–44)
ECHO LV INTERNAL DIMENSION DIASTOLE INDEX: 2.03 CM/M2
ECHO LV INTERNAL DIMENSION DIASTOLIC: 3.5 CM (ref 3.9–5.3)
ECHO LV INTERNAL DIMENSION SYSTOLIC INDEX: 1.45 CM/M2
ECHO LV INTERNAL DIMENSION SYSTOLIC: 2.5 CM
ECHO LV IVSD: 1.5 CM (ref 0.6–0.9)
ECHO LV MASS 2D: 173 G (ref 67–162)
ECHO LV MASS INDEX 2D: 100.6 G/M2 (ref 43–95)
ECHO LV POSTERIOR WALL DIASTOLIC: 1.3 CM (ref 0.6–0.9)
ECHO LV RELATIVE WALL THICKNESS RATIO: 0.74
ECHO LVOT AREA: 3.5 CM2
ECHO LVOT DIAM: 2.1 CM
ECHO LVOT MEAN GRADIENT: 4 MMHG
ECHO LVOT PEAK GRADIENT: 8 MMHG
ECHO LVOT PEAK VELOCITY: 1.4 M/S
ECHO LVOT STROKE VOLUME INDEX: 56 ML/M2
ECHO LVOT SV: 96.2 ML
ECHO LVOT VTI: 27.8 CM
ECHO MV A VELOCITY: 1.26 M/S
ECHO MV E DECELERATION TIME (DT): 65.4 MS
ECHO MV E VELOCITY: 0.74 M/S
ECHO MV E/A RATIO: 0.59
ECHO MV E/E' LATERAL: 24.67
ECHO MV E/E' RATIO (AVERAGED): 21.58
ECHO MV E/E' SEPTAL: 18.5
ECHO RV FREE WALL PEAK S': 16 CM/S
ECHO RV TAPSE: 2.1 CM (ref 1.7–?)
ECHO TV REGURGITANT MAX VELOCITY: 2.87 M/S
ECHO TV REGURGITANT PEAK GRADIENT: 33 MMHG
EOSINOPHIL # BLD: 0.2 K/UL (ref 0–0.4)
EOSINOPHIL NFR BLD: 3 % (ref 0–5)
ERYTHROCYTE [DISTWIDTH] IN BLOOD BY AUTOMATED COUNT: 14.6 % (ref 11.6–14.5)
GLOBULIN SER CALC-MCNC: 3.8 G/DL (ref 2–4)
GLUCOSE SERPL-MCNC: 102 MG/DL (ref 74–99)
HCT VFR BLD AUTO: 28.7 % (ref 35–45)
HGB BLD-MCNC: 8.7 G/DL (ref 12–16)
IMM GRANULOCYTES # BLD AUTO: 0 K/UL (ref 0–0.04)
IMM GRANULOCYTES NFR BLD AUTO: 0 % (ref 0–0.5)
LYMPHOCYTES # BLD: 2.3 K/UL (ref 0.9–3.6)
LYMPHOCYTES NFR BLD: 33 % (ref 21–52)
MCH RBC QN AUTO: 25.1 PG (ref 24–34)
MCHC RBC AUTO-ENTMCNC: 30.3 G/DL (ref 31–37)
MCV RBC AUTO: 82.9 FL (ref 78–100)
MONOCYTES # BLD: 1 K/UL (ref 0.05–1.2)
MONOCYTES NFR BLD: 14 % (ref 3–10)
NEUTS SEG # BLD: 3.4 K/UL (ref 1.8–8)
NEUTS SEG NFR BLD: 49 % (ref 40–73)
NRBC # BLD: 0 K/UL (ref 0–0.01)
NRBC BLD-RTO: 0 PER 100 WBC
PLATELET # BLD AUTO: 218 K/UL (ref 135–420)
PMV BLD AUTO: 10.8 FL (ref 9.2–11.8)
POTASSIUM SERPL-SCNC: 3.4 MMOL/L (ref 3.5–5.5)
PROT SERPL-MCNC: 7.4 G/DL (ref 6.4–8.2)
RBC # BLD AUTO: 3.46 M/UL (ref 4.2–5.3)
SERVICE CMNT-IMP: NORMAL
SODIUM SERPL-SCNC: 142 MMOL/L (ref 136–145)
WBC # BLD AUTO: 6.9 K/UL (ref 4.6–13.2)

## 2022-08-01 PROCEDURE — 74011250637 HC RX REV CODE- 250/637: Performed by: HOSPITALIST

## 2022-08-01 PROCEDURE — 74011250636 HC RX REV CODE- 250/636: Performed by: INTERNAL MEDICINE

## 2022-08-01 PROCEDURE — 36415 COLL VENOUS BLD VENIPUNCTURE: CPT

## 2022-08-01 PROCEDURE — 80053 COMPREHEN METABOLIC PANEL: CPT

## 2022-08-01 PROCEDURE — 74011250637 HC RX REV CODE- 250/637: Performed by: INTERNAL MEDICINE

## 2022-08-01 PROCEDURE — 85025 COMPLETE CBC W/AUTO DIFF WBC: CPT

## 2022-08-01 PROCEDURE — 93306 TTE W/DOPPLER COMPLETE: CPT

## 2022-08-01 PROCEDURE — 99239 HOSP IP/OBS DSCHRG MGMT >30: CPT | Performed by: HOSPITALIST

## 2022-08-01 RX ORDER — AMOXICILLIN AND CLAVULANATE POTASSIUM 875; 125 MG/1; MG/1
1 TABLET, FILM COATED ORAL EVERY 12 HOURS
Qty: 6 TABLET | Refills: 0 | Status: SHIPPED | OUTPATIENT
Start: 2022-08-01 | End: 2022-08-04

## 2022-08-01 RX ORDER — CLONIDINE HYDROCHLORIDE 0.1 MG/1
0.2 TABLET ORAL DAILY
Status: DISCONTINUED | OUTPATIENT
Start: 2022-08-01 | End: 2022-08-01 | Stop reason: HOSPADM

## 2022-08-01 RX ORDER — CLONIDINE HYDROCHLORIDE 0.2 MG/1
0.2 TABLET ORAL DAILY
Qty: 30 TABLET | Refills: 0 | Status: SHIPPED | OUTPATIENT
Start: 2022-08-02 | End: 2022-09-01

## 2022-08-01 RX ORDER — MINOXIDIL 2.5 MG/1
2.5 TABLET ORAL DAILY
Qty: 30 TABLET | Refills: 0 | Status: SHIPPED | OUTPATIENT
Start: 2022-08-02

## 2022-08-01 RX ADMIN — MINOXIDIL 2.5 MG: 2.5 TABLET ORAL at 08:59

## 2022-08-01 RX ADMIN — ATENOLOL 50 MG: 50 TABLET ORAL at 08:59

## 2022-08-01 RX ADMIN — CLONIDINE HYDROCHLORIDE 0.2 MG: 0.1 TABLET ORAL at 08:59

## 2022-08-01 RX ADMIN — ATORVASTATIN CALCIUM 20 MG: 20 TABLET, FILM COATED ORAL at 08:59

## 2022-08-01 RX ADMIN — HYDRALAZINE HYDROCHLORIDE 10 MG: 20 INJECTION, SOLUTION INTRAMUSCULAR; INTRAVENOUS at 05:03

## 2022-08-01 NOTE — PROGRESS NOTES
Bedside and Verbal shift change report given to Nikole Morrow (oncoming nurse) by .me (offgoing nurse). Report included the following information SBAR, Kardex, Intake/Output, MAR, and Recent Results.   Rolan Sanchez

## 2022-08-01 NOTE — PROGRESS NOTES
INTERIM UPDATE - 2024 EST on 7/31/2022    Nursing Staff calls to report Blood Pressure 181/77 mm Hg. Patient noted to have been on 5 Blood Pressure medications at home, but arrived with orthostatic hypotension and Acute Kidney Injury. No antihypertensive home medications resumed this visit as of yet. Plan: Will add PRN Antihypertensives: PRN Topical Nitroglycerin 0.5\" q6hrs (First-Line) and PRN IV Hydralazine 10 mg q6hrs (Second-Line) for Acute Hypertension. If Hypertension persists, plan to add back home oral medications that are not nephrotoxic (Clonidine, Minoxidil, and/or Atenolol).

## 2022-08-01 NOTE — DISCHARGE SUMMARY
Hospitalist Discharge Summary    Patient: Natalie Delgado MRN: [id number]  CSN: [id number]    YOB: 1945  Age: 76 y.o. Sex: female    DOA: 7/30/2022 LOS:  LOS: 2 days   Discharge Date:     Admission Diagnoses: UTI (urinary tract infection) [N39.0]  REN (acute kidney injury) ([address] [N17.9]  Anemia [D64.9]  Left ventricular hypertrophy [I51.7]    Discharge Diagnoses:    REN  History of hypertension  Anemia of chronic disease  UTI    Discharge Condition: Fair    Discharge To: Home    CODE STATUS: Full Code      PHYSICAL EXAM  Visit Vitals  BP (!) 170/79 (BP 1 Location: Right upper arm)   Pulse 95   Temp 98.2 °F (36.8 °C)   Resp 20   Ht 5' 4\" (1.626 m)   Wt 66.7 kg (147 lb)   SpO2 100%   Breastfeeding No   BMI 25.23 kg/m²       General: Alert, cooperative, no acute distress    Lungs:  CTA Bilaterally. No Wheezing/Rhonchi/Rales. Heart:  Regular rate and Rhythm. Abdomen: Soft, Non distended, Non tender. + Bowel sounds. Extremities: No edema/ cyanosis/ clubbing  Neurologic:  AA oriented X 3. Moves all extremities. 64-year-old female presents to the emergency room secondary to generalized weakness. ER evaluation patient noted to have UTI, started on broad-spectrum IV antibiotics and admission to the hospital for further evaluation. Patient mentions she was doing well till recently, no recent change in medications. Mentions she has been taking her blood pressure medications despite losing weight. ER evaluation-patient noted to be orthostatic on arrival, resuscitated with IV fluids with improvement of blood pressure. Patient was also noted to have REN-continue IV fluids, monitor strict I's and O's. Hold all antihypertensive at this time. Patient will be admitted to the hospital for further evaluation.         Hospital Course:     Patient admitted to the hospital secondary to generalized weakness, ER evaluation patient noted to have a UTI as well as hypotension. Patient has a known history of hypertension and is on multiple blood pressure medications. Patient was also noted to have REN, all blood pressure medications were held secondary to hypotension and patient started on IV fluids. Patient's blood pressure improved with IV fluids as well as her creatinine. Patient's home medication regimen was reconciled and medications adjusted prior to discharge. Patient also noted to have a UTI, treated with IV antibiotics and discharged on p.o. antibiotics. Patient is advised to closely follow-up with her PCP in 2 weeks. Patient verbalized understanding    Follow up Care: With PCP in 2 weeks    Consults: None    Significant Diagnostic Studies:     Imaging:  XR Results (most recent):  Results from Hospital Encounter encounter on 07/30/22    XR CHEST PORT    Narrative  CHEST PORTABLE 1601 hours    COMPARISON: 2006. INDICATIONS: Dizziness. FINDINGS:    Portable single view chest demonstrates:    Lungs: Clear. Cardiac Silhouette And Mediastinal Contours: Moderately enlarged cardiac  contours. Pleural Spaces: No pneumothorax or pleural effusion evident. Bones And Soft Tissues: Advanced degenerative changes are evident particularly  in the shoulders. Impression  No active or acute cardiopulmonary process is evident. Severe arthritic changes are present in the shoulders. Cardiomegaly       CT Results (most recent):  Results from Hospital Encounter encounter on 07/30/22    CT HEAD WO CONT    Narrative  CT OF THE HEAD WITHOUT CONTRAST. CLINICAL HISTORY: Headache for 2 days.     TECHNIQUE: Helical scan obtained of the head were obtained from the skull vertex  through the base of the skull without intravenous contrast.    All CT scans are  performed using dose optimization techniques as appropriate to the performed  exam including the following: Automated exposure control, adjustment of mA  and/or kV according to patient size, and use of iterative reconstructive  technique. COMPARISON: 12/10/2020. FINDINGS:    Stable mild right greater than left cerebellar chronic infarct. Mild  periventricular white matter hypodensities are not significantly changed. Sulcal  pattern is maintained. No new findings. No hydrocephalus. No intracranial  hemorrhage. No mass effect. The visualized paranasal sinuses are clear. The  mastoid air cells are clear. Similar appearance of partially imaged chronic  severe calcific thickening of the ligamentous tissues posterior to the odontoid  contributing to at least mild C1 level spinal stenosis. Impression  1. Stable exam. No acute findings. 2. Stable chronic cerebellar infarcts and chronic microvascular disease. 3. Grossly stable chronic C1 level cervical spinal stenosis due to hypertrophic  calcific ligamentous tissues posterior to the odontoid. This can be better  assessed with MRI cervical spine imaging. 07/30/22    ECHO ADULT COMPLETE 08/01/2022 8/1/2022    Interpretation Summary  Formatting of this result is different from the original.      Left Ventricle: Normal left ventricular systolic function with a visually estimated EF of 55 - 60%. Left ventricle is smaller than normal. Increased wall thickness. Findings consistent with moderate concentric hypertrophy. Normal wall motion. Diastolic dysfunction present with normal LV EF. Aorta: Dilated aortic root. Ao Root diameter is 3.6 cm. Dilated ascending aorta. Ao Ascending diameter is 3.7 cm. Signed by: Maryann Keith MD on 8/1/2022 11:08 AM       MRI Results (most recent):  No results found for this or any previous visit. Procedures:     None       Current Discharge Medication List        START taking these medications    Details   amoxicillin-clavulanate (Augmentin) 875-125 mg per tablet Take 1 Tablet by mouth every twelve (12) hours for 3 days.   Qty: 6 Tablet, Refills: 0  Start date: 8/1/2022, End date: 8/4/2022           CONTINUE these medications which have CHANGED    Details   cloNIDine HCL (CATAPRES) 0.2 mg tablet Take 1 Tablet by mouth in the morning for 30 days. Qty: 30 Tablet, Refills: 0  Start date: 8/2/2022, End date: 9/1/2022      minoxidiL (LONITEN) 2.5 mg tablet Take 1 Tablet by mouth in the morning. Qty: 30 Tablet, Refills: 0  Start date: 8/2/2022           CONTINUE these medications which have NOT CHANGED    Details   atenolol (TENORMIN) 50 mg tablet Take 50 mg by mouth daily. Indications: HYPERTENSION      simvastatin (ZOCOR) 40 mg tablet Take 40 mg by mouth nightly. STOP taking these medications       hydrochlorothiazide (HYDRODIURIL) 25 mg tablet Comments:   Reason for Stopping:         captopril (CAPOTEN) 50 mg tablet Comments:   Reason for Stopping:         methylPREDNISolone (MEDROL, DAVINA,) 4 mg tablet Comments:   Reason for Stopping:                 Activity: Activity as tolerated    Diet: Cardiac Diet    Wound Care: None needed      Andrews Fine MD  8/1/2022, 11:46 AM    Total time spent 32 mins  Disclaimer: Sections of this note are dictated using utilizing voice recognition software. Minor typographical errors may be present. If questions arise, please do not hesitate to contact me or call our department.

## 2022-08-01 NOTE — DISCHARGE INSTRUCTIONS
Admit Date: 7/30/22    Discharge Date: 8/1/22      DISCHARGE SUMMARY from Nurse PATIENT INSTRUCTIONS:    After general anesthesia or intravenous sedation, for 24 hours or while taking prescription Narcotics:  Limit your activities  Do not drive and operate hazardous machinery  Do not make important personal or business decisions  Do  not drink alcoholic beverages  If you have not urinated within 8 hours after discharge, please contact your surgeon on call. Report the following to your surgeon:  Excessive pain, swelling, redness or odor of or around the surgical area  Temperature over 100.5  Nausea and vomiting lasting longer than 4 hours or if unable to take medications  Any signs of decreased circulation or nerve impairment to extremity: change in color, persistent  numbness, tingling, coldness or increase pain  Any questions    What to do at Home:  Recommended activity: Activity as tolerated,     If you experience any of the following symptoms chest pain, shortness of breath, please follow up with Primary Care Physcian. *  Please give a list of your current medications to your Primary Care Provider. *  Please update this list whenever your medications are discontinued, doses are      changed, or new medications (including over-the-counter products) are added. *  Please carry medication information at all times in case of emergency situations. These are general instructions for a healthy lifestyle:    No smoking/ No tobacco products/ Avoid exposure to second hand smoke  Surgeon General's Warning:  Quitting smoking now greatly reduces serious risk to your health.     Obesity, smoking, and sedentary lifestyle greatly increases your risk for illness    A healthy diet, regular physical exercise & weight monitoring are important for maintaining a healthy lifestyle    You may be retaining fluid if you have a history of heart failure or if you experience any of the following symptoms:  Weight gain of 3 pounds or more overnight or 5 pounds in a week, increased swelling in our hands or feet or shortness of breath while lying flat in bed.   Please call your doctor as soon as you notice any of these symptoms; do not wait until your next office visit.        ___________________________________________________________________________________________________________________________________

## 2022-08-01 NOTE — PROGRESS NOTES
D/C order noted for today. Orders reviewed. No needs identified at this time. CM remains available if needed.     NOHEMY WilburnN, RN  Pager # [phone]  Care Manager

## 2022-08-01 NOTE — PROGRESS NOTES
Wound Prevention Checklist    Patient: Kell Luna (83 y.o. female)  Date: 7/31/2022  Diagnosis: UTI (urinary tract infection) [N39.0]  REN (acute kidney injury) ([facility] [address] [N17.9]  Anemia [D64.9]  Left ventricular hypertrophy [I51.7] <principal problem not specified>    Precautions:         []  Heel prevention boots placed on patient    []  Patient turned q2h during shift    []  Lift team ordered    [x]  Patient on [facility] bed/Specialty bed    []  Each Wound is documented during shift (Stage, Color, drainage, odor, measurements, and dressings)    []  Dual skin checks done at bedside during shift report with Baljit Greer

## 2022-08-01 NOTE — PROGRESS NOTES
1335 - Patient not in room, personal belongings not in room. Staff stated they saw patient leaving the unit with a visitor. Supervisor informed and Dr. Henrik Huffman informed. [facility] informed patient discharged without receiving discharge instructions and IV removal. Patient information given to officer to check patient for IV and removal    7876 - Patient called again and answer the phone. She stated she removed IV herself and threw in the trash.

## 2022-08-29 PROBLEM — N39.0 UTI (URINARY TRACT INFECTION): Status: RESOLVED | Noted: 2022-07-30 | Resolved: 2022-08-29

## 2023-04-14 ENCOUNTER — HOSPITAL ENCOUNTER (EMERGENCY)
Facility: HOSPITAL | Age: 78
Discharge: HOME | End: 2023-04-14
Attending: EMERGENCY MEDICINE
Payer: MEDICARE

## 2023-04-14 VITALS
HEART RATE: 87 BPM | TEMPERATURE: 98.1 F | OXYGEN SATURATION: 100 % | RESPIRATION RATE: 20 BRPM | DIASTOLIC BLOOD PRESSURE: 88 MMHG | SYSTOLIC BLOOD PRESSURE: 164 MMHG

## 2023-04-14 DIAGNOSIS — J06.9 VIRAL URI WITH COUGH: Primary | ICD-10-CM

## 2023-04-14 LAB
FLUAV RNA SPEC QL NAA+PROBE: NOT DETECTED
FLUBV RNA SPEC QL NAA+PROBE: NOT DETECTED
SARS-COV-2 RNA RESP QL NAA+PROBE: NOT DETECTED

## 2023-04-14 PROCEDURE — 99283 EMERGENCY DEPT VISIT LOW MDM: CPT

## 2023-04-14 PROCEDURE — 87636 SARSCOV2 & INF A&B AMP PRB: CPT

## 2023-04-14 RX ORDER — GUAIFENESIN/DEXTROMETHORPHAN 100-10MG/5
5 SYRUP ORAL 4 TIMES DAILY PRN
Qty: 118 ML | Refills: 0 | Status: SHIPPED | OUTPATIENT
Start: 2023-04-14 | End: 2023-04-24

## 2023-04-14 ASSESSMENT — ENCOUNTER SYMPTOMS
SINUS PAIN: 0
EYES NEGATIVE: 1
SORE THROAT: 0
RHINORRHEA: 0
COUGH: 1
ANAL BLEEDING: 0
SHORTNESS OF BREATH: 0
CONSTIPATION: 0
APNEA: 0
NAUSEA: 0
FACIAL SWELLING: 0
VOMITING: 0
ABDOMINAL DISTENTION: 0
TROUBLE SWALLOWING: 0
ABDOMINAL PAIN: 0
DIARRHEA: 0
BLOOD IN STOOL: 0
SINUS PRESSURE: 0
CHEST TIGHTNESS: 0
BACK PAIN: 0

## 2023-04-14 NOTE — ED PROVIDER NOTES
Notes, Reevaluation, and Consults:  78-year-old female presents to the emergency department with complaints of cough and occasional body aches for the past 1 to 2 weeks states occasional productive cough with white sputum. Afebrile. No nausea vomiting diarrhea. No chest pain or shortness of breath. Patient does admit to occasional headaches on and off. Mild congestion. Patient states has tried aspirin and Benadryl with some relief. nontoxic-appearing, looks well. No evidence of otitis media/externa, strep  pharyngitis, influenza, pneumonia. COVID-negative. Procedures:  Procedures      Care plan outlined and precautions discussed. Results were reviewed with the patient. All medications were reviewed with the patient. All of pt's questions and concerns were addressed. Alarm symptoms and return precautions associated with chief complaint and evaluation were reviewed with the patient in detail. The patient demonstrated adequate understanding. Patient was instructed to follow up with PCP, as well as given strict return precautions to the ED upon further deterioration. Patient is ready for discharge home. Diagnosis     Clinical Impression:   1.  Viral URI with cough        Disposition: home      [facility] EMERGENCY DEPT  [address]  [phone]    If symptoms worsen    Arabella Jacobson MD  [address] [place] [address]  [phone]    Schedule an appointment as soon as possible for a visit in 5 days  Follow up within 5 days, Return to ED sooner if symptoms worsen          Medication List        START taking these medications      guaiFENesin-dextromethorphan 100-10 MG/5ML syrup  Commonly known as: ROBITUSSIN DM  Take 5 mLs by mouth 4 times daily as needed for Cough            ASK your doctor about these medications      atenolol 50 MG tablet  Commonly known as: TENORMIN     minoxidil 2.5 MG tablet  Commonly known as: LONITEN     simvastatin 40 MG

## 2023-04-14 NOTE — ED TRIAGE NOTES
Patient states she started feeling sick about a week ago. Endorses cough, body aches, and thinks she may have had a fever.     Denies any SOB or CP at this time    Does state when she coughs it hurts her chest.

## 2023-10-12 ENCOUNTER — APPOINTMENT (OUTPATIENT)
Facility: HOSPITAL | Age: 78
End: 2023-10-12
Payer: MEDICARE

## 2023-10-12 ENCOUNTER — HOSPITAL ENCOUNTER (EMERGENCY)
Facility: HOSPITAL | Age: 78
Discharge: HOME | End: 2023-10-12
Attending: EMERGENCY MEDICINE
Payer: MEDICARE

## 2023-10-12 VITALS
OXYGEN SATURATION: 96 % | HEIGHT: 64 IN | SYSTOLIC BLOOD PRESSURE: 148 MMHG | TEMPERATURE: 98.4 F | BODY MASS INDEX: 23.9 KG/M2 | HEART RATE: 78 BPM | WEIGHT: 140 LBS | RESPIRATION RATE: 22 BRPM | DIASTOLIC BLOOD PRESSURE: 70 MMHG

## 2023-10-12 DIAGNOSIS — R10.84 GENERALIZED ABDOMINAL PAIN: Primary | ICD-10-CM

## 2023-10-12 LAB
ALBUMIN SERPL-MCNC: 4.4 G/DL (ref 3.4–5)
ALBUMIN/GLOB SERPL: 1.1 (ref 0.8–1.7)
ALP SERPL-CCNC: 94 U/L (ref 45–117)
ALT SERPL-CCNC: 21 U/L (ref 13–56)
ANION GAP SERPL CALC-SCNC: 6 MMOL/L (ref 3–18)
APPEARANCE UR: CLEAR
AST SERPL-CCNC: 38 U/L (ref 10–38)
BACTERIA URNS QL MICRO: NEGATIVE /HPF
BASOPHILS # BLD: 0 K/UL (ref 0–0.1)
BASOPHILS NFR BLD: 1 % (ref 0–2)
BILIRUB SERPL-MCNC: 0.7 MG/DL (ref 0.2–1)
BILIRUB UR QL: NEGATIVE
BUN SERPL-MCNC: 18 MG/DL (ref 7–18)
BUN/CREAT SERPL: 16 (ref 12–20)
CALCIUM SERPL-MCNC: 9.4 MG/DL (ref 8.5–10.1)
CHLORIDE SERPL-SCNC: 102 MMOL/L (ref 100–111)
CO2 SERPL-SCNC: 27 MMOL/L (ref 21–32)
COLOR UR: YELLOW
CREAT SERPL-MCNC: 1.15 MG/DL (ref 0.6–1.3)
DIFFERENTIAL METHOD BLD: ABNORMAL
EKG ATRIAL RATE: 72 BPM
EKG DIAGNOSIS: NORMAL
EKG P AXIS: 46 DEGREES
EKG P-R INTERVAL: 144 MS
EKG Q-T INTERVAL: 424 MS
EKG QRS DURATION: 82 MS
EKG QTC CALCULATION (BAZETT): 464 MS
EKG R AXIS: 47 DEGREES
EKG T AXIS: 65 DEGREES
EKG VENTRICULAR RATE: 72 BPM
EOSINOPHIL # BLD: 0 K/UL (ref 0–0.4)
EOSINOPHIL NFR BLD: 0 % (ref 0–5)
EPITH CASTS URNS QL MICRO: NORMAL /LPF (ref 0–5)
ERYTHROCYTE [DISTWIDTH] IN BLOOD BY AUTOMATED COUNT: 13.9 % (ref 11.6–14.5)
GLOBULIN SER CALC-MCNC: 4 G/DL (ref 2–4)
GLUCOSE SERPL-MCNC: 130 MG/DL (ref 74–99)
GLUCOSE UR STRIP.AUTO-MCNC: NEGATIVE MG/DL
GRAN CASTS URNS QL MICRO: NORMAL /LPF
HCT VFR BLD AUTO: 33.3 % (ref 35–45)
HGB BLD-MCNC: 10.3 G/DL (ref 12–16)
HGB UR QL STRIP: NEGATIVE
IMM GRANULOCYTES # BLD AUTO: 0 K/UL (ref 0–0.04)
IMM GRANULOCYTES NFR BLD AUTO: 1 % (ref 0–0.5)
KETONES UR QL STRIP.AUTO: NEGATIVE MG/DL
LEUKOCYTE ESTERASE UR QL STRIP.AUTO: ABNORMAL
LIPASE SERPL-CCNC: 91 U/L (ref 73–393)
LYMPHOCYTES # BLD: 1.1 K/UL (ref 0.9–3.6)
LYMPHOCYTES NFR BLD: 19 % (ref 21–52)
MAGNESIUM SERPL-MCNC: 2.1 MG/DL (ref 1.6–2.6)
MCH RBC QN AUTO: 26.5 PG (ref 24–34)
MCHC RBC AUTO-ENTMCNC: 30.9 G/DL (ref 31–37)
MCV RBC AUTO: 85.8 FL (ref 78–100)
MONOCYTES # BLD: 0.4 K/UL (ref 0.05–1.2)
MONOCYTES NFR BLD: 7 % (ref 3–10)
NEUTS SEG # BLD: 4.2 K/UL (ref 1.8–8)
NEUTS SEG NFR BLD: 73 % (ref 40–73)
NITRITE UR QL STRIP.AUTO: NEGATIVE
NRBC # BLD: 0 K/UL (ref 0–0.01)
NRBC BLD-RTO: 0 PER 100 WBC
PH UR STRIP: 6.5 (ref 5–8)
PLATELET # BLD AUTO: 256 K/UL (ref 135–420)
PMV BLD AUTO: 10.4 FL (ref 9.2–11.8)
POTASSIUM SERPL-SCNC: 4 MMOL/L (ref 3.5–5.5)
PROT SERPL-MCNC: 8.4 G/DL (ref 6.4–8.2)
PROT UR STRIP-MCNC: 100 MG/DL
RBC # BLD AUTO: 3.88 M/UL (ref 4.2–5.3)
RBC #/AREA URNS HPF: NORMAL /HPF (ref 0–5)
SODIUM SERPL-SCNC: 135 MMOL/L (ref 136–145)
SP GR UR REFRACTOMETRY: 1.01 (ref 1–1.03)
TROPONIN I SERPL HS-MCNC: 19 NG/L (ref 0–54)
UROBILINOGEN UR QL STRIP.AUTO: 1 EU/DL (ref 0.2–1)
WBC # BLD AUTO: 5.7 K/UL (ref 4.6–13.2)
WBC URNS QL MICRO: NORMAL /HPF (ref 0–4)

## 2023-10-12 PROCEDURE — 6360000004 HC RX CONTRAST MEDICATION: Performed by: EMERGENCY MEDICINE

## 2023-10-12 PROCEDURE — 83735 ASSAY OF MAGNESIUM: CPT

## 2023-10-12 PROCEDURE — 93010 ELECTROCARDIOGRAM REPORT: CPT | Performed by: INTERNAL MEDICINE

## 2023-10-12 PROCEDURE — 85025 COMPLETE CBC W/AUTO DIFF WBC: CPT

## 2023-10-12 PROCEDURE — 74177 CT ABD & PELVIS W/CONTRAST: CPT

## 2023-10-12 PROCEDURE — 83690 ASSAY OF LIPASE: CPT

## 2023-10-12 PROCEDURE — 80053 COMPREHEN METABOLIC PANEL: CPT

## 2023-10-12 PROCEDURE — 84484 ASSAY OF TROPONIN QUANT: CPT

## 2023-10-12 PROCEDURE — 2580000003 HC RX 258: Performed by: PHYSICIAN ASSISTANT

## 2023-10-12 PROCEDURE — 6360000002 HC RX W HCPCS: Performed by: EMERGENCY MEDICINE

## 2023-10-12 PROCEDURE — 71045 X-RAY EXAM CHEST 1 VIEW: CPT

## 2023-10-12 PROCEDURE — 6360000002 HC RX W HCPCS: Performed by: PHYSICIAN ASSISTANT

## 2023-10-12 PROCEDURE — 99285 EMERGENCY DEPT VISIT HI MDM: CPT

## 2023-10-12 PROCEDURE — 81001 URINALYSIS AUTO W/SCOPE: CPT

## 2023-10-12 PROCEDURE — 96375 TX/PRO/DX INJ NEW DRUG ADDON: CPT

## 2023-10-12 PROCEDURE — 93005 ELECTROCARDIOGRAM TRACING: CPT | Performed by: EMERGENCY MEDICINE

## 2023-10-12 PROCEDURE — 96374 THER/PROPH/DIAG INJ IV PUSH: CPT

## 2023-10-12 RX ORDER — ONDANSETRON 2 MG/ML
4 INJECTION INTRAMUSCULAR; INTRAVENOUS
Status: COMPLETED | OUTPATIENT
Start: 2023-10-12 | End: 2023-10-12

## 2023-10-12 RX ORDER — MORPHINE SULFATE 4 MG/ML
4 INJECTION, SOLUTION INTRAMUSCULAR; INTRAVENOUS
Status: COMPLETED | OUTPATIENT
Start: 2023-10-12 | End: 2023-10-12

## 2023-10-12 RX ORDER — 0.9 % SODIUM CHLORIDE 0.9 %
500 INTRAVENOUS SOLUTION INTRAVENOUS ONCE
Status: COMPLETED | OUTPATIENT
Start: 2023-10-12 | End: 2023-10-12

## 2023-10-12 RX ORDER — MORPHINE SULFATE 2 MG/ML
2 INJECTION, SOLUTION INTRAMUSCULAR; INTRAVENOUS
Status: COMPLETED | OUTPATIENT
Start: 2023-10-12 | End: 2023-10-12

## 2023-10-12 RX ADMIN — SODIUM CHLORIDE 500 ML: 9 INJECTION, SOLUTION INTRAVENOUS at 10:34

## 2023-10-12 RX ADMIN — MORPHINE SULFATE 2 MG: 2 INJECTION, SOLUTION INTRAMUSCULAR; INTRAVENOUS at 10:34

## 2023-10-12 RX ADMIN — ONDANSETRON 4 MG: 2 INJECTION INTRAMUSCULAR; INTRAVENOUS at 10:34

## 2023-10-12 RX ADMIN — MORPHINE SULFATE 4 MG: 4 INJECTION, SOLUTION INTRAMUSCULAR; INTRAVENOUS at 14:19

## 2023-10-12 RX ADMIN — IOPAMIDOL 80 ML: 612 INJECTION, SOLUTION INTRAVENOUS at 12:56

## 2023-10-12 ASSESSMENT — PAIN SCALES - GENERAL
PAINLEVEL_OUTOF10: 9
PAINLEVEL_OUTOF10: 4
PAINLEVEL_OUTOF10: 9

## 2023-10-12 ASSESSMENT — PAIN - FUNCTIONAL ASSESSMENT: PAIN_FUNCTIONAL_ASSESSMENT: 0-10

## 2023-10-12 NOTE — ED TRIAGE NOTES
Pt presenting with mid sternal abd pain radiating to back starting last night. R leg started hurting first then \"jumped\" to stomach. Mild nausea, but no emesis or diarrhea.

## 2023-10-12 NOTE — ED PROVIDER NOTES
EMERGENCY DEPARTMENT HISTORY AND PHYSICAL EXAM    10:29 AM EDT seen at this time in room 13        Date: 10/12/2023  Patient Name: Josesito Lawler    History of Presenting Illness     Chief Complaint   Patient presents with    Abdominal Pain         History Provided By: patient, son is present    Additional History (Context): Josesito Lawler is a 78 y.o. female presents with onset last night of abdominal pain diffuse and periumbilical, severe, constant. Normal bowel movements and passing gas no vomiting. No flank pain or dysuria. Rates her pain as severe at the time of my exam  History of hypertension and heart disease. PCP: Travis Glover MD    Chief Complaint:   Duration:    Timing:    Location:   Quality:   Severity:   Modifying Factors:   Associated Symptoms:       No current facility-administered medications for this encounter.      Current Outpatient Medications   Medication Sig Dispense Refill    atenolol (TENORMIN) 50 MG tablet Take 50 mg by mouth daily      minoxidil (LONITEN) 2.5 MG tablet Take 2.5 mg by mouth daily      simvastatin (ZOCOR) 40 MG tablet Take 40 mg by mouth         Past History     Past Medical History:  Past Medical History:   Diagnosis Date    Arthritis     High cholesterol     Hypertension     Ill-defined condition     Enlarged Heart        Past Surgical History:  Past Surgical History:   Procedure Laterality Date    BREAST SURGERY      cyst removed    [place] CARDIAC SURG PROCEDURE UNLIST      cardiac cath       Family History:  Family History   Problem Relation Age of Onset    Cancer Brother        Social History:  Social History     Tobacco Use    Smoking status: Never   Substance Use Topics    Alcohol use: No    Drug use: No       Allergies:  No Known Allergies      Review of Systems     Review of Systems      Physical Exam       Patient Vitals for the past 12 hrs:   Temp Pulse Resp BP SpO2   10/12/23 1545 -- 78 22 (!) 148/70 96 %   10/12/23 0945 -- 67 14 (!) 169/75 100 %

## 2024-01-25 ENCOUNTER — HOSPITAL ENCOUNTER (EMERGENCY)
Facility: HOSPITAL | Age: 79
Discharge: HOME | End: 2024-01-25
Attending: EMERGENCY MEDICINE
Payer: MEDICARE

## 2024-01-25 VITALS
DIASTOLIC BLOOD PRESSURE: 95 MMHG | HEART RATE: 75 BPM | SYSTOLIC BLOOD PRESSURE: 210 MMHG | HEIGHT: 64 IN | RESPIRATION RATE: 20 BRPM | BODY MASS INDEX: 23.9 KG/M2 | OXYGEN SATURATION: 100 % | WEIGHT: 140 LBS | TEMPERATURE: 98 F

## 2024-01-25 DIAGNOSIS — R03.0 ELEVATED BLOOD PRESSURE READING: ICD-10-CM

## 2024-01-25 DIAGNOSIS — E87.6 HYPOKALEMIA: ICD-10-CM

## 2024-01-25 DIAGNOSIS — T78.3XXA ACE INHIBITOR-AGGRAVATED ANGIOEDEMA, INITIAL ENCOUNTER: Primary | ICD-10-CM

## 2024-01-25 DIAGNOSIS — T46.4X5A ACE INHIBITOR-AGGRAVATED ANGIOEDEMA, INITIAL ENCOUNTER: Primary | ICD-10-CM

## 2024-01-25 LAB
ALBUMIN SERPL-MCNC: 4 G/DL (ref 3.4–5)
ALBUMIN/GLOB SERPL: 0.9 (ref 0.8–1.7)
ALP SERPL-CCNC: 83 U/L (ref 45–117)
ALT SERPL-CCNC: 18 U/L (ref 13–56)
ANION GAP SERPL CALC-SCNC: 7 MMOL/L (ref 3–18)
AST SERPL-CCNC: 26 U/L (ref 10–38)
BASOPHILS # BLD: 0.1 K/UL (ref 0–0.1)
BASOPHILS NFR BLD: 1 % (ref 0–2)
BILIRUB SERPL-MCNC: 0.8 MG/DL (ref 0.2–1)
BUN SERPL-MCNC: 25 MG/DL (ref 7–18)
BUN/CREAT SERPL: 12 (ref 12–20)
CALCIUM SERPL-MCNC: 9.7 MG/DL (ref 8.5–10.1)
CHLORIDE SERPL-SCNC: 103 MMOL/L (ref 100–111)
CO2 SERPL-SCNC: 23 MMOL/L (ref 21–32)
CREAT SERPL-MCNC: 2.17 MG/DL (ref 0.6–1.3)
DIFFERENTIAL METHOD BLD: ABNORMAL
EOSINOPHIL # BLD: 0.1 K/UL (ref 0–0.4)
EOSINOPHIL NFR BLD: 1 % (ref 0–5)
ERYTHROCYTE [DISTWIDTH] IN BLOOD BY AUTOMATED COUNT: 15.5 % (ref 11.6–14.5)
GLOBULIN SER CALC-MCNC: 4.6 G/DL (ref 2–4)
GLUCOSE SERPL-MCNC: 99 MG/DL (ref 74–99)
HCT VFR BLD AUTO: 26.9 % (ref 35–45)
HGB BLD-MCNC: 8.5 G/DL (ref 12–16)
IMM GRANULOCYTES # BLD AUTO: 0 K/UL (ref 0–0.04)
IMM GRANULOCYTES NFR BLD AUTO: 0 % (ref 0–0.5)
LYMPHOCYTES # BLD: 2.7 K/UL (ref 0.9–3.6)
LYMPHOCYTES NFR BLD: 31 % (ref 21–52)
MCH RBC QN AUTO: 26.5 PG (ref 24–34)
MCHC RBC AUTO-ENTMCNC: 31.6 G/DL (ref 31–37)
MCV RBC AUTO: 83.8 FL (ref 78–100)
MONOCYTES # BLD: 0.8 K/UL (ref 0.05–1.2)
MONOCYTES NFR BLD: 9 % (ref 3–10)
NEUTS SEG # BLD: 5 K/UL (ref 1.8–8)
NEUTS SEG NFR BLD: 58 % (ref 40–73)
NRBC # BLD: 0 K/UL (ref 0–0.01)
NRBC BLD-RTO: 0 PER 100 WBC
PLATELET # BLD AUTO: 343 K/UL (ref 135–420)
PMV BLD AUTO: 10.1 FL (ref 9.2–11.8)
POTASSIUM SERPL-SCNC: 2.8 MMOL/L (ref 3.5–5.5)
PROT SERPL-MCNC: 8.6 G/DL (ref 6.4–8.2)
RBC # BLD AUTO: 3.21 M/UL (ref 4.2–5.3)
SODIUM SERPL-SCNC: 133 MMOL/L (ref 136–145)
WBC # BLD AUTO: 8.6 K/UL (ref 4.6–13.2)

## 2024-01-25 PROCEDURE — 85025 COMPLETE CBC W/AUTO DIFF WBC: CPT

## 2024-01-25 PROCEDURE — 80053 COMPREHEN METABOLIC PANEL: CPT

## 2024-01-25 PROCEDURE — 6370000000 HC RX 637 (ALT 250 FOR IP): Performed by: EMERGENCY MEDICINE

## 2024-01-25 PROCEDURE — 96375 TX/PRO/DX INJ NEW DRUG ADDON: CPT

## 2024-01-25 PROCEDURE — 2500000003 HC RX 250 WO HCPCS: Performed by: EMERGENCY MEDICINE

## 2024-01-25 PROCEDURE — 6360000002 HC RX W HCPCS: Performed by: EMERGENCY MEDICINE

## 2024-01-25 PROCEDURE — 99284 EMERGENCY DEPT VISIT MOD MDM: CPT

## 2024-01-25 PROCEDURE — A4216 STERILE WATER/SALINE, 10 ML: HCPCS | Performed by: EMERGENCY MEDICINE

## 2024-01-25 PROCEDURE — 2580000003 HC RX 258: Performed by: EMERGENCY MEDICINE

## 2024-01-25 PROCEDURE — 96374 THER/PROPH/DIAG INJ IV PUSH: CPT

## 2024-01-25 RX ORDER — DIPHENHYDRAMINE HYDROCHLORIDE 50 MG/ML
25 INJECTION INTRAMUSCULAR; INTRAVENOUS
Status: COMPLETED | OUTPATIENT
Start: 2024-01-25 | End: 2024-01-25

## 2024-01-25 RX ORDER — CETIRIZINE HYDROCHLORIDE 10 MG/1
10 TABLET ORAL DAILY
Qty: 10 TABLET | Refills: 0 | Status: SHIPPED | OUTPATIENT
Start: 2024-01-25 | End: 2024-02-04

## 2024-01-25 RX ORDER — METHYLPREDNISOLONE 4 MG/1
TABLET ORAL
Qty: 1 KIT | Refills: 0 | Status: SHIPPED | OUTPATIENT
Start: 2024-01-25 | End: 2024-01-31

## 2024-01-25 RX ORDER — CLONIDINE HYDROCHLORIDE 0.1 MG/1
0.2 TABLET ORAL
Status: COMPLETED | OUTPATIENT
Start: 2024-01-25 | End: 2024-01-25

## 2024-01-25 RX ORDER — FAMOTIDINE 20 MG/1
20 TABLET, FILM COATED ORAL 2 TIMES DAILY
Qty: 20 TABLET | Refills: 0 | Status: SHIPPED | OUTPATIENT
Start: 2024-01-25 | End: 2024-02-04

## 2024-01-25 RX ORDER — POTASSIUM CHLORIDE 20 MEQ/1
40 TABLET, EXTENDED RELEASE ORAL DAILY
Qty: 20 TABLET | Refills: 0 | Status: SHIPPED | OUTPATIENT
Start: 2024-01-25 | End: 2024-02-04

## 2024-01-25 RX ADMIN — FAMOTIDINE 20 MG: 10 INJECTION, SOLUTION INTRAVENOUS at 19:01

## 2024-01-25 RX ADMIN — WATER 125 MG: 1 INJECTION INTRAMUSCULAR; INTRAVENOUS; SUBCUTANEOUS at 18:58

## 2024-01-25 RX ADMIN — DIPHENHYDRAMINE HYDROCHLORIDE 25 MG: 50 INJECTION INTRAMUSCULAR; INTRAVENOUS at 19:06

## 2024-01-25 RX ADMIN — POTASSIUM BICARBONATE 40 MEQ: 782 TABLET, EFFERVESCENT ORAL at 20:04

## 2024-01-25 RX ADMIN — CLONIDINE HYDROCHLORIDE 0.2 MG: 0.1 TABLET ORAL at 20:42

## 2024-01-25 ASSESSMENT — PAIN SCALES - GENERAL: PAINLEVEL_OUTOF10: 0

## 2024-01-25 ASSESSMENT — PAIN - FUNCTIONAL ASSESSMENT: PAIN_FUNCTIONAL_ASSESSMENT: 0-10

## 2024-01-25 ASSESSMENT — ENCOUNTER SYMPTOMS
CHEST TIGHTNESS: 0
EYES NEGATIVE: 1
ABDOMINAL PAIN: 0
FACIAL SWELLING: 1

## 2024-01-25 NOTE — ED TRIAGE NOTES
Facial swelling on left side noticed this am at 0700. \"Later on this afternoon\" started moving to the right side. Pt states swelling has gone done. Pt denies tongue swelling, difficulty breathing or feeling of something stuck in throat. Breathing even and unlabored.

## 2024-01-26 NOTE — ED NOTES
Received care of patient from TULIO Reyes.  Pt ambulatory to the restroom without difficulty.  Denies any other needs at this time.   Son present in the room.

## 2024-01-26 NOTE — FLOWSHEET NOTE
01/25/24 2030   Vital Signs   Temp 98 °F (36.7 °C)   Temp Source Oral   Pulse 75   Respirations 20   BP (!) 210/95   MAP (Calculated) 133   MAP (mmHg) 130   Oxygen Therapy   SpO2 100 %   Pulse via Oximetry 74 beats per minute   O2 Device None (Room air)     Pt discharged at this time- clonidine given prior to discharge per Dr. Hsu. Provider aware of pt blood pressure. Pt currently denies any s/s of CP, SOB, dizziness.   Follow up requirements explained to patient and highlighted in discharge paperwork. Pt and son state understanding with regards to follow up.  Lisinopril added to allergy list and pt instructed not to take this medication any longer. Pt states understanding.  Pt declined a wheelchair upon discharge; ambulatory from the ED with her son. Steady gait noted.

## 2024-01-26 NOTE — DISCHARGE INSTRUCTIONS
Make sure to stop the lisinopril and it caused your reaction today.  You need to take your other blood pressure medications and I would suggest that you call your primary doctor tomorrow to review the medications as I have some concerns that your blood pressure is not well-controlled on your current medications and will need to stop the lisinopril.  I have given you steroids and antihistamines I would like you to follow-up closely as an outpatient and return if you are at all worsened or concerned.  Your potassium is also low and that will need to be should rechecked next week.

## 2024-01-26 NOTE — ED PROVIDER NOTES
EMERGENCY DEPARTMENT HISTORY AND PHYSICAL EXAM    7:09 PM      Date: 1/25/2024  Patient Name: Stacie Wharton    History of Presenting Illness     Chief Complaint   Patient presents with    Facial Swelling       History From: Patient  Patient is an 78-year-old female with a history of hypertension, recently started on lisinopril, the presents emergency department with facial swelling that was noted this morning.  Patient said the left side of her face was swollen and mostly in her lips and radiated to the right side today as the day went on.  Patient has no swelling in her mouth or difficulty swallowing and says she been able to eat and drink fine.  Patient was just concerned that the swelling had progressed to the other side of her mouth.  Patient said that she has been on medication now for at least several months but is relatively new to her.  Patient denies any other aggravating relieving factors.  Patient denies any medications for symptoms.  Patient is not a smoker, drinker, no drug user.             Nursing Notes were all reviewed and agreed with or any disagreements were addressed in the HPI.    PCP: Mason Dent Jr., MD    Current Facility-Administered Medications   Medication Dose Route Frequency Provider Last Rate Last Admin    potassium bicarb-citric acid (EFFER-K) effervescent tablet 40 mEq  40 mEq Oral NOW Alexandre Flowers MD         Current Outpatient Medications   Medication Sig Dispense Refill    atenolol (TENORMIN) 50 MG tablet Take 50 mg by mouth daily      minoxidil (LONITEN) 2.5 MG tablet Take 2.5 mg by mouth daily      simvastatin (ZOCOR) 40 MG tablet Take 40 mg by mouth         Past History     Past Medical History:  Past Medical History:   Diagnosis Date    Arthritis     High cholesterol     Hypertension     Ill-defined condition     Enlarged Heart        Past Surgical History:  Past Surgical History:   Procedure Laterality Date    BREAST SURGERY      cyst removed    [place] CARDIAC SURG

## 2024-03-05 ENCOUNTER — HOSPITAL ENCOUNTER (OUTPATIENT)
Facility: HOSPITAL | Age: 79
Discharge: HOME | End: 2024-03-08
Payer: MEDICARE

## 2024-03-05 DIAGNOSIS — N18.32 STAGE 3B CHRONIC KIDNEY DISEASE (HCC): ICD-10-CM

## 2024-03-05 PROCEDURE — 76770 US EXAM ABDO BACK WALL COMP: CPT
